# Patient Record
Sex: FEMALE | Race: WHITE | Employment: FULL TIME | URBAN - METROPOLITAN AREA
[De-identification: names, ages, dates, MRNs, and addresses within clinical notes are randomized per-mention and may not be internally consistent; named-entity substitution may affect disease eponyms.]

---

## 2018-12-19 ENCOUNTER — TELEPHONE (OUTPATIENT)
Dept: OTHER | Facility: CLINIC | Age: 43
End: 2018-12-19

## 2019-01-15 ENCOUNTER — OFFICE VISIT (OUTPATIENT)
Dept: FAMILY MEDICINE | Facility: CLINIC | Age: 44
End: 2019-01-15
Payer: COMMERCIAL

## 2019-01-15 VITALS
SYSTOLIC BLOOD PRESSURE: 130 MMHG | OXYGEN SATURATION: 98 % | WEIGHT: 149 LBS | DIASTOLIC BLOOD PRESSURE: 81 MMHG | RESPIRATION RATE: 18 BRPM | TEMPERATURE: 97.8 F | HEIGHT: 66 IN | HEART RATE: 80 BPM | BODY MASS INDEX: 23.95 KG/M2

## 2019-01-15 DIAGNOSIS — Z00.00 WELL ADULT EXAM: Primary | ICD-10-CM

## 2019-01-15 DIAGNOSIS — L98.9 SKIN LESION: ICD-10-CM

## 2019-01-15 DIAGNOSIS — N84.0 UTERINE POLYP: ICD-10-CM

## 2019-01-15 PROCEDURE — G0145 SCR C/V CYTO,THINLAYER,RESCR: HCPCS | Performed by: FAMILY MEDICINE

## 2019-01-15 PROCEDURE — 87624 HPV HI-RISK TYP POOLED RSLT: CPT | Performed by: FAMILY MEDICINE

## 2019-01-15 PROCEDURE — G0124 SCREEN C/V THIN LAYER BY MD: HCPCS | Performed by: FAMILY MEDICINE

## 2019-01-15 PROCEDURE — 99386 PREV VISIT NEW AGE 40-64: CPT | Performed by: FAMILY MEDICINE

## 2019-01-15 ASSESSMENT — ENCOUNTER SYMPTOMS
COUGH: 0
HEMATOCHEZIA: 0
PALPITATIONS: 0
ARTHRALGIAS: 0
CHILLS: 0
JOINT SWELLING: 0
PARESTHESIAS: 0
DIZZINESS: 0
HEADACHES: 0
FEVER: 0
FREQUENCY: 0
BREAST MASS: 0
DIARRHEA: 0
NERVOUS/ANXIOUS: 0
SHORTNESS OF BREATH: 0
CONSTIPATION: 0
SORE THROAT: 0
WEAKNESS: 0
EYE PAIN: 0
HEARTBURN: 0
ABDOMINAL PAIN: 0
MYALGIAS: 0
NAUSEA: 0
DYSURIA: 0
HEMATURIA: 0

## 2019-01-15 ASSESSMENT — MIFFLIN-ST. JEOR: SCORE: 1346.58

## 2019-01-15 NOTE — PROGRESS NOTES
SUBJECTIVE:   CC: Maxine Segundo is an 43 year old woman who presents for preventive health visit.     Physical   Annual:     Getting at least 3 servings of Calcium per day:  Yes    Bi-annual eye exam:  Yes    Dental care twice a year:  Yes    Sleep apnea or symptoms of sleep apnea:  None    Diet:  Vegetarian/vegan    Frequency of exercise:  4-5 days/week    Duration of exercise:  45-60 minutes    Taking medications regularly:  No    Barriers to taking medications:  None    Additional concerns today:  Yes (uterine polyps )    PHQ-2 Total Score: 0    CV: no early CAD in the family, she is active and has a healthy diet  Malignancy: last pap was 3 yrs ago, states she is due, denies h/o abnormal pap.  No breast or colon cancer in the family.  She has not had mammogram.  Bone health: no risk factors  Immunizations: tdap done 3 yrs ago  Sexual bill: she has three children who are grown and in college. She has a 3 yo daughter and would like to have another child after the uterine polyp has been addressed. Periods were always regular until about 4-5 months ago, when she started having some irregular bleeding between periods, which remain normal.  US found a uterine polyp.  She was scheduled to have this removed, but due to insurance change, she will need to establish with a new OB/GYN within our system.    Depression screen: negative.     April is a native of [place] . She, her  and 3 yo daughter moved here 7 months ago for her 's job ([employer]). She works for [employer] in [place].  Her three older children remain in [place] going to college.              Today's PHQ-2 Score:   PHQ-2 ( 1999 [employer]) 1/15/2019   Q1: Little interest or pleasure in doing things 0   Q2: Feeling down, depressed or hopeless 0   PHQ-2 Score 0   Q1: Little interest or pleasure in doing things Not at all   Q2: Feeling down, depressed or hopeless Not at all   PHQ-2 Score 0     Abuse: Current or Past(Physical, Sexual or  Emotional)- No  Do you feel safe in your environment? Yes    Social History     Tobacco Use     Smoking status: Never Smoker     Smokeless tobacco: Never Used   Substance Use Topics     Alcohol use: Yes     Alcohol Use 1/15/2019   If you drink alcohol do you typically have greater than 3 drinks per day OR greater than 7 drinks per week? No   No flowsheet data found.    Reviewed orders with patient.  Reviewed health maintenance and updated orders accordingly - Yes  There is no problem list on file for this patient.    History reviewed. No pertinent surgical history.    Social History     Tobacco Use     Smoking status: Never Smoker     Smokeless tobacco: Never Used   Substance Use Topics     Alcohol use: Yes     Family History   Problem Relation Age of Onset     Brain Cancer Mother          No current outpatient medications on file.     No Known Allergies    Mammogram Screening: Patient under age 50, mutual decision reflected in health maintenance.      Pertinent mammograms are reviewed under the imaging tab.  History of abnormal Pap smear: NO - age 30-65 PAP every 5 years with negative HPV co-testing recommended     Reviewed and updated as needed this visit by clinical staff  Tobacco  Allergies  Meds  Med Hx  Surg Hx  Fam Hx  Soc Hx        Reviewed and updated as needed this visit by Provider            Review of Systems   Constitutional: Negative for chills and fever.   HENT: Negative for congestion, ear pain, hearing loss and sore throat.    Eyes: Negative for pain and visual disturbance.   Respiratory: Negative for cough and shortness of breath.    Cardiovascular: Negative for chest pain, palpitations and peripheral edema.   Gastrointestinal: Negative for abdominal pain, constipation, diarrhea, heartburn, hematochezia and nausea.   Breasts:  Negative for tenderness, breast mass and discharge.   Genitourinary: Positive for vaginal bleeding. Negative for dysuria, frequency, genital sores, hematuria, pelvic  "pain, urgency and vaginal discharge.   Musculoskeletal: Negative for arthralgias, joint swelling and myalgias.   Skin: Negative for rash.   Neurological: Negative for dizziness, weakness, headaches and paresthesias.   Psychiatric/Behavioral: Negative for mood changes. The patient is not nervous/anxious.           OBJECTIVE:   /81 (BP Location: Right arm, Patient Position: Sitting, Cuff Size: Adult Regular)   Pulse 80   Temp 97.8  F (36.6  C) (Oral)   Resp 18   Ht 1.683 m (5' 6.25\")   Wt 67.6 kg (149 lb)   LMP 12/27/2018 (Exact Date)   SpO2 98%   BMI 23.87 kg/m    Physical Exam  GENERAL: healthy, alert and no distress  EYES: Eyes grossly normal to inspection, PERRL and conjunctivae and sclerae normal  HENT: ear canals and TM's normal, nose and mouth without ulcers or lesions  NECK: no adenopathy, no asymmetry, masses, or scars and thyroid normal to palpation  RESP: lungs clear to auscultation - no rales, rhonchi or wheezes  BREAST: normal without masses, tenderness or nipple discharge and no palpable axillary masses or adenopathy  CV: regular rate and rhythm, normal S1 S2, no S3 or S4, no murmur, click or rub, no peripheral edema and peripheral pulses strong  ABDOMEN: soft, nontender, no hepatosplenomegaly, no masses and bowel sounds normal   (female): normal female external genitalia, normal urethral meatus, vaginal mucosa pink, moist, well rugated, and normal cervix/adnexa/uterus without masses or discharge; cervix is friable  MS: no gross musculoskeletal defects noted, no edema  SKIN: a few scattered benign-appearing nevi to her back; there is a larger 4-5mm nevus with dark to medium brown pigment and irregular borders to her center chest.  Otherwise, no suspicious lesions or rashes  NEURO: Normal strength and tone, mentation intact and speech normal  PSYCH: mentation appears normal, affect normal/bright        ASSESSMENT/PLAN:   1. Well adult exam  CV: no risk factors; recommend fasting labs next " "year.  Malignancy: pap done; discussed current guidelines for mammography.  Colonoscopy at 50.  Bone health: no risk factors  Immunizations: up to date  Sexual health: referral is done to OB/GYN regarding the uterine poyp  - Pap imaged thin layer screen with HPV - recommended age 30 - 65 years (select HPV order below)  - HPV High Risk Types DNA Cervical    2. Uterine polyp    - OB/GYN REFERRAL    3. Skin lesion    - DERMATOLOGY REFERRAL    COUNSELING:  Reviewed preventive health counseling, as reflected in patient instructions    BP Readings from Last 1 Encounters:   01/15/19 130/81     Estimated body mass index is 23.87 kg/m  as calculated from the following:    Height as of this encounter: 1.683 m (5' 6.25\").    Weight as of this encounter: 67.6 kg (149 lb).           reports that  has never smoked. she has never used smokeless tobacco.      Counseling Resources:  ATP IV Guidelines  Pooled Cohorts Equation Calculator  Breast Cancer Risk Calculator  FRAX Risk Assessment  ICSI Preventive Guidelines  Dietary Guidelines for Americans, 2010  USDA's MyPlate  ASA Prophylaxis  Lung CA Screening    Selena Maciel MD  [facility]  "

## 2019-01-18 ENCOUNTER — RESULT FOLLOW UP (OUTPATIENT)
Dept: FAMILY MEDICINE | Facility: CLINIC | Age: 44
End: 2019-01-18

## 2019-01-18 DIAGNOSIS — R87.613 HSIL (HIGH GRADE SQUAMOUS INTRAEPITHELIAL LESION) ON PAP SMEAR OF CERVIX: ICD-10-CM

## 2019-01-18 LAB
COPATH REPORT: ABNORMAL
PAP: ABNORMAL

## 2019-01-18 NOTE — PROGRESS NOTES
1/15/19 HSIL,+HR HPV, not 16/18. Plan colp by 4/15/19.  01/22/19:Msg left to call back. (sk)  01/29/19:Msg left to call back. Pt was advised. (sk)  03/05/19: [place] Bx's HERVE 1-3. Plan LEEP with Hysteroscopy in the OR, due bef 06/05/19.(sk)  03/12/19: Pt was advised. (sk)  04/17/19: LEEP HERVE 3, margin's neg. Plan cotest in 1 year. (sk)  04/23/19:Msg left to call back. Pt was advised. (sk)  2/11/20 ASCUS pap with Neg HPV. Plan cotest in 1 year per Ob/Gyn provider. (ROSALINDA) Tc to mail saved result and recommendation letter. Pt isn't viewing [employer] messages. (sk)  02/18/20 Result letter sent at request of RN. (leticia)

## 2019-01-21 LAB
FINAL DIAGNOSIS: ABNORMAL
HPV HR 12 DNA CVX QL NAA+PROBE: POSITIVE
HPV16 DNA SPEC QL NAA+PROBE: NEGATIVE
HPV18 DNA SPEC QL NAA+PROBE: NEGATIVE
SPECIMEN DESCRIPTION: ABNORMAL
SPECIMEN SOURCE CVX/VAG CYTO: ABNORMAL

## 2019-03-05 ENCOUNTER — OFFICE VISIT (OUTPATIENT)
Dept: OBGYN | Facility: CLINIC | Age: 44
End: 2019-03-05
Payer: COMMERCIAL

## 2019-03-05 VITALS
DIASTOLIC BLOOD PRESSURE: 60 MMHG | WEIGHT: 147 LBS | HEART RATE: 72 BPM | SYSTOLIC BLOOD PRESSURE: 100 MMHG | BODY MASS INDEX: 23.55 KG/M2

## 2019-03-05 DIAGNOSIS — N84.0 ENDOMETRIAL POLYP: ICD-10-CM

## 2019-03-05 DIAGNOSIS — R87.613 HSIL (HIGH GRADE SQUAMOUS INTRAEPITHELIAL LESION) ON PAP SMEAR OF CERVIX: ICD-10-CM

## 2019-03-05 LAB — HCG UR QL: NEGATIVE

## 2019-03-05 PROCEDURE — 57454 BX/CURETT OF CERVIX W/SCOPE: CPT | Performed by: OBSTETRICS & GYNECOLOGY

## 2019-03-05 PROCEDURE — 81025 URINE PREGNANCY TEST: CPT | Performed by: OBSTETRICS & GYNECOLOGY

## 2019-03-05 PROCEDURE — 88305 TISSUE EXAM BY PATHOLOGIST: CPT | Performed by: OBSTETRICS & GYNECOLOGY

## 2019-03-05 NOTE — PROGRESS NOTES
Maxine Segundo is a 43 year old female  who presents for initial colposcopy, referred by Selena Maciel. Pap smear 2 months ago showed: HGSIL with other HR HPV. This was first abnormal pap smear and moved from [place] last July.  Prior Pap smear had been 3 years ago the birth of her daughter.    Had been receiving care with [facility] and then insurance changed at the first of the year.  Has known to small fibroids and a polyp that was seen inside the uterus on ultrasound.  Wants to discuss procedure to remove this today as well.  Ultrasound in care everywhere.  Myomata:  Location Longitudinal AP Transverse    Post Lt 2.4 2.1 2.5 cm subserosal   Post Mid 1.9 1.5 1.4 cm subserosal     Saline infusion sonohysterogram: yes.  Indication:   abnormal bleeding.   Following informed consent, the cervix was visualized and prepped with   betadine. An insemination catheter was placed into the endometrial cavity   and saline was infused while watching with the transvaginal probe.   Findings were:   echogenic mass protruding into endometrial cavity 0.9 x   0.6x 1.0 cm, location: Ant Lt, broad based  endometrium smooth and regular    Patient's last menstrual period was 12/27/2018 (exact date).  UPT today is negative  Patient does not smoke  Type of contraception: none  Age at first sexual intercourse:   Number of sexual partners (lifetime): *Past GYN history:  No STD history  Prior cervical/vaginal disease: none.  Prior cervical treatment: no treatment.      PROCEDURE:  Before the procedure, it was ensured that the patient was educated regarding the nature of her findings to date, the implications, and what was to be done. She has been made aware of the role of HPV, the natural history of infection, ways to minimize her future risk, the effect of HPV on the cervix, and treatment options available should they be indicated.  The details of the   colposcopic procedure were reviewed.  All questions were answered before  proceeding, and informed consent was therefore obtained.    Speculum placed in vagina and excellent visualization of cervix   acheived, cervix swabbed x 3 with acetic acid solution.    FINDINGS:  Cervix: acetowhite area from 9:00 to 12:00, mosaicism noted at 4-6:00 and nabothian cysts also noted  Please refer to images section for details.    Pap repeated?:  No  SCJ seen?:  no    ECC done?:  Yes   Lugol's solution used?:  Yes   Satisfactory examination?:  no    Xylocaine jelly was applied and cervical biopsy done at 6:00 and ECC sent in separate specimen containers to pathology.  Patient tolerated well.  Patient had brisk bleeding from the 6 o'clock position and lots of Monsel's and pressure was used to obtain hemostasis EBL 30 cc    ASSESSMENT: HERVE 2 and HERVE 3.    PLAN: specimens labelled and sent to Pathology, will base further treatment on Pathology findings, treatment options discussed with patient and call to discuss Pathology results.  Discussed with her Pap history and amount of bleeding she had with the biopsy, suspect she will need a LEEP procedure.  She was given the ACOG brochure on LEEP and the procedure was reviewed.    Hysteroscopy was also discussed with the patient for polypectomy and this could be a combined procedure.  Reviewed was done under IV sedation with paracervical block and outpatient.  We will find out biopsy results from coloposcopy first and then to figure out plan for outpatient surgery.    Janet Garay MD

## 2019-03-05 NOTE — NURSING NOTE
"Chief Complaint   Patient presents with     Colposcopy       Initial /60 (BP Location: Right arm, Patient Position: Sitting, Cuff Size: Adult Regular)   Pulse 72   Wt 66.7 kg (147 lb)   LMP 2018 (Exact Date)   BMI 23.55 kg/m   Estimated body mass index is 23.55 kg/m  as calculated from the following:    Height as of 1/15/19: 1.683 m (5' 6.25\").    Weight as of this encounter: 66.7 kg (147 lb).  BP completed using cuff size: regular    Questioned patient about current smoking habits.  Pt. has never smoked.          The following HM Due: NONE      The following patient reported/Care Every where data was sent to:  P ABSTRACT QUALITY INITIATIVES [[id number]]  KIKI Hendrickson           "

## 2019-03-05 NOTE — PATIENT INSTRUCTIONS
GYN Ultrasound    Exam performed on:  11/20/2018  Referring provider: Marta Garay  Referring clinic: [facility]  Clinical indications: abnormal bleeding  LMP:  11/13/18  Sonographer(s) initials: DP    The pelvic organs are imaged using: both transvaginal and transabdominal   transducers to better visualize the pelvic anatomy.  Today's ultrasound was compared to previous report from: none    Measurements and comments  Uterus:  Longitudinal AP Transverse   10.3 4.6 7.3 cm   Position:  anteverted  Comments:  symmetrical    Cervix and lower uterine segment are: Normal  Myometrium: heterogeneous    Myomata:  Location Longitudinal AP Transverse    Post Lt 2.4 2.1 2.5 cm subserosal   Post Mid 1.9 1.5 1.4 cm subserosal     Saline infusion sonohysterogram: yes.  Indication:   abnormal bleeding.   Following informed consent, the cervix was visualized and prepped with   betadine. An insemination catheter was placed into the endometrial cavity   and saline was infused while watching with the transvaginal probe.   Findings were:   echogenic mass protruding into endometrial cavity 0.9 x   0.6x 1.0 cm, location: Ant Lt, broad based  endometrium smooth and regular    anterior endometrium 3.5 mm  posterior endometrium 2.8 mm    Endometrium: 6.3 mm, post SIS  Endometrium: 9.9 mm, pre SIS    Right ovary:   Longitudinal AP Transverse   2.9 2.2 2.3 cm   Comments: appears normal    Left ovary:  Longitudinal AP Transverse   2.0 1.2 1.6 cm   Comments: appears normal    Adnexa:  no masses seen    Peritoneal fluid: absent    Impression: Sessile endometrial polyp arising from the anterior left   endometrium.   Uterus with multiple myomas, none encroaching on the endometrial cavity.    Plan: These findings were reviewed with the patient. She will follow up   with her referring provider.     Nemo Castillo MD      Call [phone] if bleeding or questions about the cervix

## 2019-03-08 LAB — COPATH REPORT: NORMAL

## 2019-03-11 DIAGNOSIS — N84.0 ENDOMETRIAL POLYP: ICD-10-CM

## 2019-03-11 DIAGNOSIS — R87.613 HSIL (HIGH GRADE SQUAMOUS INTRAEPITHELIAL LESION) ON PAP SMEAR OF CERVIX: Primary | ICD-10-CM

## 2019-03-11 NOTE — PROGRESS NOTES
Called and spoke with patient about her cervical biopsy results and need for LEEP procedure.  She will schedule this with hysteroscopy and polypectomy as we had discussed at her colposcopy appointment.    Janet Garay MD

## 2019-03-12 ENCOUNTER — TELEPHONE (OUTPATIENT)
Dept: OBGYN | Facility: CLINIC | Age: 44
End: 2019-03-12

## 2019-03-12 NOTE — TELEPHONE ENCOUNTER
Type of surgery: gyn  Location of surgery: [facility]  Date and time of surgery: 4/17/19 1140a  Surgeon: Jarrod  Pre-Op Appt Date: tbd  Post-Op Appt Date: tbd   Packet sent out: Yes  Pre-cert/Authorization completed:  No  Date: 3/12/2019    Fatuma BREWER, Surgery Coordinator

## 2019-03-21 ENCOUNTER — OFFICE VISIT (OUTPATIENT)
Dept: FAMILY MEDICINE | Facility: CLINIC | Age: 44
End: 2019-03-21
Payer: COMMERCIAL

## 2019-03-21 VITALS
TEMPERATURE: 98.3 F | WEIGHT: 146.8 LBS | SYSTOLIC BLOOD PRESSURE: 107 MMHG | RESPIRATION RATE: 16 BRPM | HEART RATE: 75 BPM | OXYGEN SATURATION: 98 % | HEIGHT: 67 IN | DIASTOLIC BLOOD PRESSURE: 67 MMHG | BODY MASS INDEX: 23.04 KG/M2

## 2019-03-21 DIAGNOSIS — Z01.818 PREOP GENERAL PHYSICAL EXAM: Primary | ICD-10-CM

## 2019-03-21 DIAGNOSIS — D06.9 CARCINOMA IN SITU OF CERVIX, UNSPECIFIED LOCATION: ICD-10-CM

## 2019-03-21 DIAGNOSIS — N84.0 ENDOMETRIAL POLYP: ICD-10-CM

## 2019-03-21 PROCEDURE — 99213 OFFICE O/P EST LOW 20 MIN: CPT | Performed by: FAMILY MEDICINE

## 2019-03-21 RX ORDER — FERROUS SULFATE 325(65) MG
325 TABLET ORAL
COMMUNITY

## 2019-03-21 RX ORDER — MAGNESIUM 200 MG
TABLET ORAL
COMMUNITY

## 2019-03-21 ASSESSMENT — MIFFLIN-ST. JEOR: SCORE: 1340.57

## 2019-03-21 NOTE — PROGRESS NOTES
[address] [address]  [phone]  Dept: [phone]    PRE-OP EVALUATION:  Today's date: 3/21/2019    Maxine Segundo (: 1975) presents for pre-operative evaluation assessment as requested by Dr. Shah.  She requires evaluation and anesthesia risk assessment prior to LEEP for abnormal colposcopy showing HERVE II and III and hysteroscopy and polypectomy for removal of uterine polyp.    Fax number for surgical facility: [facility]  Primary Physician: Selena Maciel  Type of Anesthesia Anticipated: to be determined    Patient has a Health Care Directive or Living Will:  NO    Preop Questions 3/21/2019   Who is doing your surgery? Dr Shah   What are you having done? LEEP and removing uterine polyp   Date of Surgery/Procedure: 19   Facility or Hospital where procedure/surgery will be performed: [facility]   1.  Do you have a history of Heart attack, stroke, stent, coronary bypass surgery, or other heart surgery? No   2.  Do you ever have any pain or discomfort in your chest? No   3.  Do you have a history of  Heart Failure? No   4.   Are you troubled by shortness of breath when:  walking on a level surface, or up a slight hill, or at night? No   5.  Do you currently have a cold, bronchitis or other respiratory infection? No   6.  Do you have a cough, shortness of breath, or wheezing? No   7.  Do you sometimes get pains in the calves of your legs when you walk? No   8. Do you or anyone in your family have previous history of blood clots? No   9.  Do you or does anyone in your family have a serious bleeding problem such as prolonged bleeding following surgeries or cuts? No   10. Have you ever had problems with anemia or been told to take iron pills? YES - in the past and currently takes iron supplement   11. Have you had any abnormal blood loss such as black, tarry or bloody stools, or abnormal vaginal bleeding? YES - irregular  periods   12. Have you ever had a blood transfusion? No   13. Have you or any of your relatives ever had problems with anesthesia? No   14. Do you have sleep apnea, excessive snoring or daytime drowsiness? No   15. Do you have any prosthetic heart valves? No   16. Do you have prosthetic joints? No   17. Is there any chance that you may be pregnant? No         HPI:     HPI related to upcoming procedure: Recent irregular bleeding found uterine polyp on U/S as cause.  Follow-up PAP was abnormal HSIL with colposcopy confirming HERVE II and III necessitating LEEP.      See problem list for active medical problems.  Problems all longstanding and stable, except as noted/documented.  See ROS for pertinent symptoms related to these conditions.                                                                                                                                                          .    MEDICAL HISTORY:     Patient Active Problem List    Diagnosis Date Noted     Endometrial polyp 03/05/2019     Priority: Medium     HERVE III (cervical intraepithelial neoplasia III) 01/15/2019     Priority: Medium     1/15/19 HSIL, +HR HPV, not 16/18. Plan colp.  03/05/19: [place] Bx's HERVE 1-3. Plan LEEP with Hysteroscopy in the OR.        Past Medical History:   Diagnosis Date     Abnormal Pap smear of cervix 01/15/2019    See problem list     Cervical high risk HPV (human papillomavirus) test positive 01/15/2019    See problem list     History reviewed. No pertinent surgical history.  Current Outpatient Medications   Medication Sig Dispense Refill     Calcium-Magnesium-Zinc 333-133-5 MG TABS per tablet        cholecalciferol (VITAMIN D3) 5000 units TABS tablet        cyanocobalamin (VITAMIN B-12) 1000 MCG SUBL sublingual tablet        ferrous sulfate (FEROSUL) 325 (65 Fe) MG tablet Take 325 mg by mouth       MULTIPLE VITAMINS-MINERALS ER PO Womens ALIVE       OTC products: None, except as noted above    Allergies   Allergen Reactions  "    Beef-Derived Products      Food      Milk Protein Extract       Latex Allergy: NO    Social History     Tobacco Use     Smoking status: Former Smoker     Types: Cigarettes     Smokeless tobacco: Never Used   Substance Use Topics     Alcohol use: Yes     Comment: wine on weekends     History   Drug Use No       REVIEW OF SYSTEMS:   CONSTITUTIONAL: NEGATIVE for fever, chills, change in weight  INTEGUMENTARY/SKIN: NEGATIVE for worrisome rashes, moles or lesions  EYES: NEGATIVE for vision changes or irritation  ENT/MOUTH: NEGATIVE for ear, mouth and throat problems  RESP: NEGATIVE for significant cough or SOB  BREAST: NEGATIVE for masses, tenderness or discharge  CV: NEGATIVE for chest pain, palpitations or peripheral edema  GI: NEGATIVE for nausea, abdominal pain, heartburn, or change in bowel habits  : NEGATIVE for frequency, dysuria, or hematuria  MUSCULOSKELETAL: NEGATIVE for significant arthralgias or myalgia  NEURO: NEGATIVE for weakness, dizziness or paresthesias  ENDOCRINE: NEGATIVE for temperature intolerance, skin/hair changes  HEME: NEGATIVE for bleeding problems  PSYCHIATRIC: NEGATIVE for changes in mood or affect    EXAM:   /67   Pulse 75   Temp 98.3  F (36.8  C) (Oral)   Resp 16   Ht 1.689 m (5' 6.5\")   Wt 66.6 kg (146 lb 12.8 oz)   LMP 02/12/2019 (Exact Date)   SpO2 98%   Breastfeeding? No   BMI 23.34 kg/m      GENERAL APPEARANCE: healthy, alert and no distress     EYES: EOMI, PERRL     HENT: ear canals and TM's normal and nose and mouth without ulcers or lesions     NECK: no adenopathy, no asymmetry, masses, or scars and thyroid normal to palpation     RESP: lungs clear to auscultation - no rales, rhonchi or wheezes     CV: regular rates and rhythm, normal S1 S2, no S3 or S4 and no murmur, click or rub     ABDOMEN:  soft, nontender, no HSM or masses and bowel sounds normal     MS: extremities normal- no gross deformities noted, no evidence of inflammation in joints, FROM in all " extremities.     SKIN: no suspicious lesions or rashes     NEURO: Normal strength and tone, sensory exam grossly normal, mentation intact and speech normal     PSYCH: mentation appears normal. and affect normal/bright     LYMPHATICS: No cervical adenopathy    DIAGNOSTICS:   No labs or EKG required for low risk surgery (cataract, skin procedure, breast biopsy, etc)    No results for input(s): HGB, PLT, INR, NA, POTASSIUM, CR, A1C in the last 64748 hours.     IMPRESSION:   Reason for surgery/procedure: as above    The proposed surgical procedure is considered LOW risk.    REVISED CARDIAC RISK INDEX  The patient has the following serious cardiovascular risks for perioperative complications such as (MI, PE, VFib and 3  AV Block):  No serious cardiac risks  INTERPRETATION: 0 risks: Class I (very low risk - 0.4% complication rate)    The patient has the following additional risks for perioperative complications:  No identified additional risks      ICD-10-CM    1. Preop general physical exam Z01.818    2. Endometrial polyp N84.0    3. Carcinoma in situ of cervix, unspecified location D06.9        RECOMMENDATIONS:     --Consult hospital rounder / IM to assist post-op medical management    --Patient is to take all scheduled medications on the day of surgery EXCEPT for modifications listed below.    APPROVAL GIVEN to proceed with proposed procedure, without further diagnostic evaluation       Signed Electronically by: Fanny Cano MD    Copy of this evaluation report is provided to requesting physician.    [facility] Preop Guidelines    Revised Cardiac Risk Index

## 2019-04-10 DIAGNOSIS — Z01.818 PRE-OP EXAM: Primary | ICD-10-CM

## 2019-04-10 DIAGNOSIS — D06.9 CIN III (CERVICAL INTRAEPITHELIAL NEOPLASIA III): ICD-10-CM

## 2019-04-15 DIAGNOSIS — D06.9 CIN III (CERVICAL INTRAEPITHELIAL NEOPLASIA III): ICD-10-CM

## 2019-04-15 DIAGNOSIS — Z01.818 PRE-OP EXAM: ICD-10-CM

## 2019-04-15 LAB
ABO + RH BLD: NORMAL
ABO + RH BLD: NORMAL
BLD GP AB SCN SERPL QL: NORMAL
BLOOD BANK CMNT PATIENT-IMP: NORMAL
ERYTHROCYTE [DISTWIDTH] IN BLOOD BY AUTOMATED COUNT: 12.6 % (ref 10–15)
HCT VFR BLD AUTO: 38.5 % (ref 35–47)
HGB BLD-MCNC: 12.3 G/DL (ref 11.7–15.7)
MCH RBC QN AUTO: 30.2 PG (ref 26.5–33)
MCHC RBC AUTO-ENTMCNC: 31.9 G/DL (ref 31.5–36.5)
MCV RBC AUTO: 95 FL (ref 78–100)
PLATELET # BLD AUTO: 192 10E9/L (ref 150–450)
RBC # BLD AUTO: 4.07 10E12/L (ref 3.8–5.2)
SPECIMEN EXP DATE BLD: NORMAL
WBC # BLD AUTO: 3.2 10E9/L (ref 4–11)

## 2019-04-15 PROCEDURE — 36415 COLL VENOUS BLD VENIPUNCTURE: CPT | Performed by: OBSTETRICS & GYNECOLOGY

## 2019-04-15 PROCEDURE — 86900 BLOOD TYPING SEROLOGIC ABO: CPT | Performed by: OBSTETRICS & GYNECOLOGY

## 2019-04-15 PROCEDURE — 86850 RBC ANTIBODY SCREEN: CPT | Performed by: OBSTETRICS & GYNECOLOGY

## 2019-04-15 PROCEDURE — 86901 BLOOD TYPING SEROLOGIC RH(D): CPT | Performed by: OBSTETRICS & GYNECOLOGY

## 2019-04-15 PROCEDURE — 85027 COMPLETE CBC AUTOMATED: CPT | Performed by: OBSTETRICS & GYNECOLOGY

## 2019-04-16 ENCOUNTER — ANESTHESIA EVENT (OUTPATIENT)
Dept: SURGERY | Facility: CLINIC | Age: 44
End: 2019-04-16
Payer: COMMERCIAL

## 2019-04-17 ENCOUNTER — ANESTHESIA (OUTPATIENT)
Dept: SURGERY | Facility: CLINIC | Age: 44
End: 2019-04-17
Payer: COMMERCIAL

## 2019-04-17 ENCOUNTER — HOSPITAL ENCOUNTER (OUTPATIENT)
Facility: CLINIC | Age: 44
Discharge: HOME | End: 2019-04-17
Attending: OBSTETRICS & GYNECOLOGY | Admitting: OBSTETRICS & GYNECOLOGY
Payer: COMMERCIAL

## 2019-04-17 VITALS
DIASTOLIC BLOOD PRESSURE: 71 MMHG | WEIGHT: 150.13 LBS | RESPIRATION RATE: 12 BRPM | SYSTOLIC BLOOD PRESSURE: 109 MMHG | BODY MASS INDEX: 23.56 KG/M2 | HEIGHT: 67 IN | OXYGEN SATURATION: 100 % | TEMPERATURE: 97.9 F | HEART RATE: 66 BPM

## 2019-04-17 LAB
GLUCOSE BLDC GLUCOMTR-MCNC: 98 MG/DL (ref 70–99)
HCG UR QL: NEGATIVE

## 2019-04-17 PROCEDURE — 36000057 ZZH SURGERY LEVEL 3 1ST 30 MIN - UMMC: Performed by: OBSTETRICS & GYNECOLOGY

## 2019-04-17 PROCEDURE — 40000170 ZZH STATISTIC PRE-PROCEDURE ASSESSMENT II: Performed by: OBSTETRICS & GYNECOLOGY

## 2019-04-17 PROCEDURE — 58558 HYSTEROSCOPY BIOPSY: CPT | Mod: GC | Performed by: OBSTETRICS & GYNECOLOGY

## 2019-04-17 PROCEDURE — 37000009 ZZH ANESTHESIA TECHNICAL FEE, EACH ADDTL 15 MIN: Performed by: OBSTETRICS & GYNECOLOGY

## 2019-04-17 PROCEDURE — 88305 TISSUE EXAM BY PATHOLOGIST: CPT | Performed by: OBSTETRICS & GYNECOLOGY

## 2019-04-17 PROCEDURE — 36000059 ZZH SURGERY LEVEL 3 EA 15 ADDTL MIN UMMC: Performed by: OBSTETRICS & GYNECOLOGY

## 2019-04-17 PROCEDURE — 57522 CONIZATION OF CERVIX: CPT | Mod: GC | Performed by: OBSTETRICS & GYNECOLOGY

## 2019-04-17 PROCEDURE — 71000027 ZZH RECOVERY PHASE 2 EACH 15 MINS: Performed by: OBSTETRICS & GYNECOLOGY

## 2019-04-17 PROCEDURE — 25000128 H RX IP 250 OP 636: Performed by: NURSE ANESTHETIST, CERTIFIED REGISTERED

## 2019-04-17 PROCEDURE — 25000125 ZZHC RX 250: Performed by: NURSE ANESTHETIST, CERTIFIED REGISTERED

## 2019-04-17 PROCEDURE — 37000008 ZZH ANESTHESIA TECHNICAL FEE, 1ST 30 MIN: Performed by: OBSTETRICS & GYNECOLOGY

## 2019-04-17 PROCEDURE — 25800030 ZZH RX IP 258 OP 636: Performed by: ANESTHESIOLOGY

## 2019-04-17 PROCEDURE — 25000125 ZZHC RX 250: Performed by: OBSTETRICS & GYNECOLOGY

## 2019-04-17 PROCEDURE — 81025 URINE PREGNANCY TEST: CPT | Performed by: OBSTETRICS & GYNECOLOGY

## 2019-04-17 PROCEDURE — 82962 GLUCOSE BLOOD TEST: CPT

## 2019-04-17 PROCEDURE — 88305 TISSUE EXAM BY PATHOLOGIST: CPT | Mod: 26 | Performed by: OBSTETRICS & GYNECOLOGY

## 2019-04-17 PROCEDURE — 27210794 ZZH OR GENERAL SUPPLY STERILE: Performed by: OBSTETRICS & GYNECOLOGY

## 2019-04-17 RX ORDER — LIDOCAINE HYDROCHLORIDE AND EPINEPHRINE 10; 10 MG/ML; UG/ML
INJECTION, SOLUTION INFILTRATION; PERINEURAL PRN
Status: DISCONTINUED | OUTPATIENT
Start: 2019-04-17 | End: 2019-04-17 | Stop reason: HOSPADM

## 2019-04-17 RX ORDER — NALOXONE HYDROCHLORIDE 0.4 MG/ML
.1-.4 INJECTION, SOLUTION INTRAMUSCULAR; INTRAVENOUS; SUBCUTANEOUS
Status: DISCONTINUED | OUTPATIENT
Start: 2019-04-17 | End: 2019-04-17 | Stop reason: HOSPADM

## 2019-04-17 RX ORDER — ONDANSETRON 2 MG/ML
INJECTION INTRAMUSCULAR; INTRAVENOUS PRN
Status: DISCONTINUED | OUTPATIENT
Start: 2019-04-17 | End: 2019-04-17

## 2019-04-17 RX ORDER — GABAPENTIN 100 MG/1
300 CAPSULE ORAL ONCE
Status: DISCONTINUED | OUTPATIENT
Start: 2019-04-17 | End: 2019-04-17 | Stop reason: HOSPADM

## 2019-04-17 RX ORDER — PROPOFOL 10 MG/ML
INJECTION, EMULSION INTRAVENOUS CONTINUOUS PRN
Status: DISCONTINUED | OUTPATIENT
Start: 2019-04-17 | End: 2019-04-17

## 2019-04-17 RX ORDER — HYDRALAZINE HYDROCHLORIDE 20 MG/ML
2.5-5 INJECTION INTRAMUSCULAR; INTRAVENOUS EVERY 10 MIN PRN
Status: DISCONTINUED | OUTPATIENT
Start: 2019-04-17 | End: 2019-04-17 | Stop reason: HOSPADM

## 2019-04-17 RX ORDER — FENTANYL CITRATE 50 UG/ML
25-50 INJECTION, SOLUTION INTRAMUSCULAR; INTRAVENOUS
Status: DISCONTINUED | OUTPATIENT
Start: 2019-04-17 | End: 2019-04-17 | Stop reason: HOSPADM

## 2019-04-17 RX ORDER — ACETAMINOPHEN 500 MG
1000 TABLET ORAL ONCE
Status: DISCONTINUED | OUTPATIENT
Start: 2019-04-17 | End: 2019-04-17 | Stop reason: HOSPADM

## 2019-04-17 RX ORDER — ONDANSETRON 4 MG/1
4 TABLET, ORALLY DISINTEGRATING ORAL EVERY 30 MIN PRN
Status: DISCONTINUED | OUTPATIENT
Start: 2019-04-17 | End: 2019-04-17 | Stop reason: HOSPADM

## 2019-04-17 RX ORDER — SODIUM CHLORIDE, SODIUM LACTATE, POTASSIUM CHLORIDE, CALCIUM CHLORIDE 600; 310; 30; 20 MG/100ML; MG/100ML; MG/100ML; MG/100ML
INJECTION, SOLUTION INTRAVENOUS CONTINUOUS
Status: DISCONTINUED | OUTPATIENT
Start: 2019-04-17 | End: 2019-04-17 | Stop reason: HOSPADM

## 2019-04-17 RX ORDER — KETOROLAC TROMETHAMINE 30 MG/ML
INJECTION, SOLUTION INTRAMUSCULAR; INTRAVENOUS PRN
Status: DISCONTINUED | OUTPATIENT
Start: 2019-04-17 | End: 2019-04-17

## 2019-04-17 RX ORDER — PROPOFOL 10 MG/ML
INJECTION, EMULSION INTRAVENOUS PRN
Status: DISCONTINUED | OUTPATIENT
Start: 2019-04-17 | End: 2019-04-17

## 2019-04-17 RX ORDER — FENTANYL CITRATE 50 UG/ML
25-50 INJECTION, SOLUTION INTRAMUSCULAR; INTRAVENOUS EVERY 5 MIN PRN
Status: DISCONTINUED | OUTPATIENT
Start: 2019-04-17 | End: 2019-04-17 | Stop reason: HOSPADM

## 2019-04-17 RX ORDER — EPHEDRINE SULFATE 50 MG/ML
INJECTION, SOLUTION INTRAVENOUS PRN
Status: DISCONTINUED | OUTPATIENT
Start: 2019-04-17 | End: 2019-04-17

## 2019-04-17 RX ORDER — ONDANSETRON 2 MG/ML
4 INJECTION INTRAMUSCULAR; INTRAVENOUS EVERY 30 MIN PRN
Status: DISCONTINUED | OUTPATIENT
Start: 2019-04-17 | End: 2019-04-17 | Stop reason: HOSPADM

## 2019-04-17 RX ORDER — LIDOCAINE HYDROCHLORIDE 20 MG/ML
INJECTION, SOLUTION INFILTRATION; PERINEURAL PRN
Status: DISCONTINUED | OUTPATIENT
Start: 2019-04-17 | End: 2019-04-17

## 2019-04-17 RX ORDER — FENTANYL CITRATE 50 UG/ML
INJECTION, SOLUTION INTRAMUSCULAR; INTRAVENOUS PRN
Status: DISCONTINUED | OUTPATIENT
Start: 2019-04-17 | End: 2019-04-17

## 2019-04-17 RX ORDER — HYDROMORPHONE HYDROCHLORIDE 1 MG/ML
.3-.5 INJECTION, SOLUTION INTRAMUSCULAR; INTRAVENOUS; SUBCUTANEOUS EVERY 10 MIN PRN
Status: DISCONTINUED | OUTPATIENT
Start: 2019-04-17 | End: 2019-04-17 | Stop reason: HOSPADM

## 2019-04-17 RX ADMIN — PROPOFOL 150 MCG/KG/MIN: 10 INJECTION, EMULSION INTRAVENOUS at 11:32

## 2019-04-17 RX ADMIN — PROPOFOL 60 MG: 10 INJECTION, EMULSION INTRAVENOUS at 11:32

## 2019-04-17 RX ADMIN — EPHEDRINE SULFATE 10 MG: 50 INJECTION, SOLUTION INTRAVENOUS at 11:56

## 2019-04-17 RX ADMIN — FENTANYL CITRATE 100 MCG: 50 INJECTION, SOLUTION INTRAMUSCULAR; INTRAVENOUS at 11:32

## 2019-04-17 RX ADMIN — KETOROLAC TROMETHAMINE 30 MG: 30 INJECTION, SOLUTION INTRAMUSCULAR at 11:45

## 2019-04-17 RX ADMIN — ONDANSETRON 4 MG: 2 INJECTION INTRAMUSCULAR; INTRAVENOUS at 11:39

## 2019-04-17 RX ADMIN — MIDAZOLAM 2 MG: 1 INJECTION INTRAMUSCULAR; INTRAVENOUS at 11:28

## 2019-04-17 RX ADMIN — LIDOCAINE HYDROCHLORIDE 60 MG: 20 INJECTION, SOLUTION INFILTRATION; PERINEURAL at 11:32

## 2019-04-17 RX ADMIN — SODIUM CHLORIDE, POTASSIUM CHLORIDE, SODIUM LACTATE AND CALCIUM CHLORIDE: 600; 310; 30; 20 INJECTION, SOLUTION INTRAVENOUS at 11:08

## 2019-04-17 ASSESSMENT — MIFFLIN-ST. JEOR: SCORE: 1358.63

## 2019-04-17 NOTE — ANESTHESIA PREPROCEDURE EVALUATION
Anesthesia Pre-Procedure Evaluation    Patient: Maxine Segundo   MRN:     [id number] Gender:   female   Age:    44 year old :      1975        Preoperative Diagnosis: HERVE Grade 2-3, Endometrial Polyp   Procedure(s):  Leep  Hysteroscopy With Polypectomy     Past Medical History:   Diagnosis Date     Abnormal Pap smear of cervix 01/15/2019    See problem list     Cervical high risk HPV (human papillomavirus) test positive 01/15/2019    See problem list      History reviewed. No pertinent surgical history.       Anesthesia Evaluation     .             ROS/MED HX    ENT/Pulmonary:  - neg pulmonary ROS     Neurologic:  - neg neurologic ROS     Cardiovascular:  - neg cardiovascular ROS       METS/Exercise Tolerance:  >4 METS   Hematologic:  - neg hematologic  ROS       Musculoskeletal:  - neg musculoskeletal ROS       GI/Hepatic:  - neg GI/hepatic ROS       Renal/Genitourinary:     (+) Other Renal/ Genitourinary, endometrial polyp HERVE      Endo:  - neg endo ROS       Psychiatric:  - neg psychiatric ROS       Infectious Disease:  - neg infectious disease ROS       Malignancy:         Other:                         PHYSICAL EXAM:   Mental Status/Neuro: A/A/O   Airway: Facies: Feasible   Respiratory:   Resp. Effort: Normal      CV:   Rate: Age appropriate   Comments:                    Lab Results   Component Value Date    WBC 3.2 (L) 04/15/2019    HGB 12.3 04/15/2019    HCT 38.5 04/15/2019     04/15/2019    HCG Negative 2019       Preop Vitals  BP Readings from Last 3 Encounters:   19 123/84   19 107/67   19 100/60    Pulse Readings from Last 3 Encounters:   19 63   19 75   19 72      Resp Readings from Last 3 Encounters:   19 16   19 16   01/15/19 18    SpO2 Readings from Last 3 Encounters:   19 100%   19 98%   01/15/19 98%      Temp Readings from Last 1 Encounters:   19 36.8  C (98.2  F) (Axillary)    Ht Readings from Last 1 Encounters:  "  04/17/19 1.702 m (5' 7\")      Wt Readings from Last 1 Encounters:   04/17/19 68.1 kg (150 lb 2.1 oz)    Estimated body mass index is 23.51 kg/m  as calculated from the following:    Height as of this encounter: 1.702 m (5' 7\").    Weight as of this encounter: 68.1 kg (150 lb 2.1 oz).     LDA:  Peripheral IV 04/17/19 Left Hand (Active)   Site Assessment WDL 4/17/2019 10:35 AM   Line Status Infusing 4/17/2019 10:35 AM   Phlebitis Scale 0-->no symptoms 4/17/2019 10:35 AM   Infiltration Scale 0 4/17/2019 10:35 AM   Number of days: 0            Assessment:   ASA SCORE: 1    NPO Status: > 6 hours since completed Solid [employer]   Documentation: H&P complete; Preop Testing complete; Consents complete   Proceeding: Proceed without further delay  Tobacco Use:  NO Active use of Tobacco/UNKNOWN Tobacco use status     Plan:   Anes. Type:  MAC   Pre-Induction: Midazolam IV   Induction:  IV (Standard)   Airway: Native Airway   Access/Monitoring: PIV   Maintenance: Propofol; IV   Emergence: Procedure Site   Logistics: Same Day Surgery     Postop Pain/Sedation Strategy:  Standard-Options: Opioids PRN     PONV Management:  Adult Risk Factors: Female, Non-Smoker, Postop Opioids  Prevention: Ondansetron; Dexamethasone; NO Volatile; Propofol Infusion     CONSENT: Direct conversation   Plan and risks discussed with: Patient                            Servando Stock MD  "

## 2019-04-17 NOTE — BRIEF OP NOTE
[facility]  Brief Operative Note    Date of Surgery: 4/17/2019    Preop Dx:  HERVE II/III, endometrial polyp     Postop Dx:   Same     Procedure:   LEEP, hysteroscopy, polypectomy    Surgeon:   Janet Garay MD    Assistants:   Janet Allan MD PGY-1     Ben Severseike, MS3     Anesthesia:  MAC     EBL:  10 cc    IVF:  1000 cc    UOP:  Unmeasured    Drains: None      Specimen:        ID Type Source Tests Collected by Time Destination   A : Endometrial polyp Tissue Uterus SURGICAL PATHOLOGY EXAM Janet Garay MD 4/17/2019 11:55 AM    B : LEEP patient right Tissue Cervix SURGICAL PATHOLOGY EXAM Janet Garay MD 4/17/2019 12:04 PM    C : LEEP patient left posterior w/ stitch Tissue Cervix SURGICAL PATHOLOGY EXAM Janet Garay MD 4/17/2019 12:13 PM    D : LEEP endocervical Tissue Cervix SURGICAL PATHOLOGY EXAM Janet Garay MD 4/17/2019 12:14 PM          Complications: None apparent     Findings: Normal external genitalia. Cervix appears large, multiparous. Cervical os is 1.5cm in length. No visible lesions. Uterine cavity normal appearing with polyp on anterior fundus.     Disposition:  Transferred in stable condition to the PACU

## 2019-04-17 NOTE — OP NOTE
[facility]   Operative Note    Date of service: 2019    Preoperative diagnosis:  HERVE II/III, endometrial polyp  Postoperative diagnosis:  Same    Procedure:  EUA, hysteroscopy, polypectomy, LEEP    Surgeon:  Janet Garay MD    Assistant:   1. Janet Allan MD PGY1  2. Ben Severerseike, MS3    Anesthesia:  MAC and 1% lidocaine with epi local  EBL:  10mL  IVF:  1000mL  UOP:  none  Fluid deficit:  300mL    Specimens: Endometrial polyp, LEEP- patient right, LEEP- patient left and posterior w/ stitch, LEEP- endocervical    Complications: None    Findings:    1. Exam under anesthesia revealed large multiparous cervix, anteverted uterus, no adnexal masses.   2. Speculum exam revealed large multiparous cervix with no lesions.   3. Hysteroscopy revealed single polyp on anterior wall of fundus, otherwise normal uterine cavity, and normal tubal ostia visualized bilaterally.       Indications:  Maxine Segundo is a 44 year old  who underwent colposcopy for HGSIL, positive for other high risk HPV.  Colposcopy pathology was significant for HGSIL (HERVE II/III) in endocervical curettings and LGSIL at 6 o'clock biopsy site. Given her findings, LEEP was recommended. Patient has also had known endometrial polyp that was visualized on saline infusion sonohysterogram and desired removal. Risks, benefits, and alternatives to the procedure were discussed with the patient who elected to proceed.  All questions were answered and an informed consent was obtained.    Procedure:  The patient was taken to the operating room where she underwent MAC anesthesia without difficulty.  She was placed in a dorsal lithotomy position using Luis F stirrups.  The patient was examined for the above noted findings and then prepped and draped in the usual sterile fashion.   A medium graves speculum was inserted into the vagina. The endocervical canal was already dilated 6 mm, confirmed using Hegar dilators.  The hysteroscope was inserted without  difficulty with the above findings noted.  The myosure was used to remove endometrial polyp. The hysteroscope was removed.  All instruments were then removed.  Rubber coated speculum was placed for the LEEP procedure. Tenaculum was placed on the anterior lip.  20cc 1% lidocaine with epinephrine was injected as an intracervical block in a clockwise fashion. A 20mm LOOP device was used to collect the above cervical specimen.  Multiple passes through the left half of the cervix were made to ensure adequate collection from the transformation zone. A 10mm LOOP device was then used to collect specimen from the endocervix. A roller ball was used to cauterize the cervical bed. The tenaculum was removed from the cervix and the puncture sites were made hemostatic roller ball.  The patient was repositioned to the supine position.  The patient tolerated the procedure well and was taken to the recovery room in stable condition.  Dr. Garay was scrubbed and present for the entire procedure.      Janet Allan MD   Resident Physician, PGY1  Obstetrics, Gynecology, and Women's Health    I was present and scrubbed for entirety of the surgical case and  agree with note as edited to reflect findings.      JANET GARAY

## 2019-04-17 NOTE — ANESTHESIA CARE TRANSFER NOTE
Patient: April Miller    Procedure(s):  Leep  Hysteroscopy With Polypectomy    Diagnosis: HERVE Grade 2-3, Endometrial Polyp  Diagnosis Additional Information: No value filed.    Anesthesia Type:   No value filed.     Note:  Airway :Room Air  Patient transferred to:Phase II  Handoff Report: Identifed the Patient, Identified the Reponsible Provider, Reviewed the pertinent medical history, Discussed the surgical course, Reviewed Intra-OP anesthesia mangement and issues during anesthesia, Set expectations for post-procedure period and Allowed opportunity for questions and acknowledgement of understanding      Vitals: (Last set prior to Anesthesia Care Transfer)    CRNA VITALS  4/17/2019 1152 - 4/17/2019 1226      4/17/2019             Pulse:  79    Ht Rate:  77    SpO2:  100 %                Electronically Signed By: SINAN Griffin CRNA  April 17, 2019  12:26 PM

## 2019-04-17 NOTE — DISCHARGE INSTRUCTIONS
Same-Day Surgery   Adult Discharge Orders & Instructions     For 24 hours after surgery:  1. Get plenty of rest.  A responsible adult must stay with you for at least 24 hours after you leave the hospital.   2. Pain medication can slow your reflexes. Do not drive or use heavy equipment.  If you have weakness or tingling, don't drive or use heavy equipment until this feeling goes away.  3. Mixing alcohol and pain medication can cause dizziness and slow your breathing. It can even be fatal. Do not drink alcohol while taking pain medication.  4. Avoid strenuous or risky activities.  Ask for help when climbing stairs.   5. You may feel lightheaded.  If so, sit for a few minutes before standing.  Have someone help you get up.   6. If you have nausea (feel sick to your stomach), drink only clear liquids such as apple juice, ginger ale, broth or 7-Up.  Rest may also help.  Be sure to drink enough fluids.  Move to a regular diet as you feel able. Take pain medications with a small amount of solid food, such as toast or crackers, to avoid nausea.   7. A slight fever is normal. Call the doctor if your fever is over 100 F (37.7 C) (taken under the tongue) or lasts longer than 24 hours.  8. You may have a dry mouth, muscle aches, trouble sleeping or a sore throat.  These symptoms should go away after 24 hours.  9. Do not make important or legal decisions.   Pain Management:      1. Take pain medication (if prescribed) for pain as directed by your physician.        2. WARNING: If the pain medication you have been prescribed contains Tylenol  (acetaminophen), DO NOT take additional doses of Tylenol (acetaminophen).     Call your doctor for any of the followin.  Signs of infection (fever, growing tenderness at the surgery site, severe pain, a large amount of drainage or bleeding, foul-smelling drainage, redness, swelling).    2.  It has been over 8 to 10 hours since surgery and you are still not able to urinate (pee).    3.   Headache for over 24 hours.    4.  Numbness, tingling or weakness the day after surgery (if you had spinal anesthesia).  To contact a doctor, call _____________________________________ or:      [phone] and ask for the Resident On Call for:          __________________________________________ (answered 24 hours a day)      Emergency Department:  [facility]: [phone]  [facility]: [phone]               Rev. 10/2014

## 2019-04-22 LAB — COPATH REPORT: NORMAL

## 2020-02-11 ENCOUNTER — OFFICE VISIT (OUTPATIENT)
Dept: OBGYN | Facility: CLINIC | Age: 45
End: 2020-02-11
Payer: COMMERCIAL

## 2020-02-11 VITALS
DIASTOLIC BLOOD PRESSURE: 79 MMHG | SYSTOLIC BLOOD PRESSURE: 127 MMHG | BODY MASS INDEX: 24.11 KG/M2 | HEIGHT: 66 IN | WEIGHT: 150 LBS

## 2020-02-11 DIAGNOSIS — N92.6 IRREGULAR MENSES: ICD-10-CM

## 2020-02-11 DIAGNOSIS — Z01.419 ENCOUNTER FOR GYNECOLOGICAL EXAMINATION WITHOUT ABNORMAL FINDING: Primary | ICD-10-CM

## 2020-02-11 DIAGNOSIS — D06.9 CARCINOMA IN SITU OF CERVIX, UNSPECIFIED LOCATION: ICD-10-CM

## 2020-02-11 DIAGNOSIS — Z13.220 SCREENING FOR LIPID DISORDERS: ICD-10-CM

## 2020-02-11 PROCEDURE — 99396 PREV VISIT EST AGE 40-64: CPT | Performed by: OBSTETRICS & GYNECOLOGY

## 2020-02-11 PROCEDURE — 88175 CYTOPATH C/V AUTO FLUID REDO: CPT | Performed by: OBSTETRICS & GYNECOLOGY

## 2020-02-11 PROCEDURE — 87624 HPV HI-RISK TYP POOLED RSLT: CPT | Performed by: OBSTETRICS & GYNECOLOGY

## 2020-02-11 PROCEDURE — 99213 OFFICE O/P EST LOW 20 MIN: CPT | Mod: 25 | Performed by: OBSTETRICS & GYNECOLOGY

## 2020-02-11 SDOH — HEALTH STABILITY: MENTAL HEALTH: HOW OFTEN DO YOU HAVE 6 OR MORE DRINKS ON ONE OCCASION?: NEVER

## 2020-02-11 SDOH — HEALTH STABILITY: MENTAL HEALTH: HOW MANY STANDARD DRINKS CONTAINING ALCOHOL DO YOU HAVE ON A TYPICAL DAY?: 1 OR 2

## 2020-02-11 SDOH — HEALTH STABILITY: MENTAL HEALTH: HOW OFTEN DO YOU HAVE A DRINK CONTAINING ALCOHOL?: 2-4 TIMES A MONTH

## 2020-02-11 ASSESSMENT — MIFFLIN-ST. JEOR: SCORE: 1342.15

## 2020-02-11 NOTE — PROGRESS NOTES
April is a 44 year old  female who presents for annual exam.     Menses are regular q 28-30 days and normal lasting 4 days.  Menses flow: normal.  Patient's last menstrual period was 2020.. Using none for contraception.  She is currently considering pregnancy.  Besides routine health maintenance, she has no other health concerns today .  Here with spouse and is hoping to have another baby.  Last pregnancy 4 years ago and this is same father baby.  Since we did her LEEP and hysteroscopy with polypectomy 2019, menses have been very regular and can feel ovulation in the middle of the cycle.  They have been timing intercourse and not using birth control.  Currently cycle day 6.  GYNECOLOGIC HISTORY:  Menarche:   April is sexually active with 1male partner(s) and is currently in monogamous relationship.    History sexually transmitted infections:HPV  STI testing offered?  Declined  BAIRON exposure: Unknown  History of abnormal Pap smear:yesnegative HPV co-testing recommended  Family history of breast CA: No  Family history of uterine/ovarian CA: No    Family history of colon CA: No    HEALTH MAINTENANCE:  Cholesterol: (No results found for: CHOL History of abnormal lipids: No  Mammo: na . History of abnormal Mammo: No.  Regular Self Breast Exams: Yes  Calcium/Vitamin D intake: source:  dietary supplement(s) Adequate? Yes  TSH: (No results found for: TSH )  Pap; (  Lab Results   Component Value Date    PAP HSIL 01/15/2019    )    HISTORY:  OB History    Para Term  AB Living   8 4 4 0 4 4   SAB TAB Ectopic Multiple Live Births   2 2 0 0 2      # Outcome Date GA Lbr Tom/2nd Weight Sex Delivery Anes PTL Lv   8 Term     F Vag-Spont   OTONIEL   7 Term      Vag-Spont      6 Term      Vag-Spont      5 Term     M Vag-Spont   OTONIEL   4 SAB            3 SAB            2 TAB            1 TAB              Past Medical History:   Diagnosis Date     Abnormal Pap smear of cervix 01/15/2019    See  problem list     Cervical high risk HPV (human papillomavirus) test positive 01/15/2019    See problem list     Past Surgical History:   Procedure Laterality Date     CONIZATION LEEP N/A 4/17/2019    Procedure: Leep;  Surgeon: Janet Garay MD;  Location: [facility] OR     OPERATIVE HYSTEROSCOPY WITH MORCELLATOR N/A 4/17/2019    Procedure: Hysteroscopy With Polypectomy;  Surgeon: Janet Garay MD;  Location: [facility] OR     Family History   Problem Relation Age of Onset     Brain Cancer Mother      Social History     Socioeconomic History     Marital status:      Spouse name: Not on file     Number of children: Not on file     Years of education: Not on file     Highest education level: Not on file   Occupational History     Not on file   Social Needs     Financial resource strain: Not on file     Food insecurity:     Worry: Not on file     Inability: Not on file     Transportation needs:     Medical: Not on file     Non-medical: Not on file   Tobacco Use     Smoking status: Former Smoker     Types: Cigarettes     Smokeless tobacco: Never Used   Substance and Sexual Activity     Alcohol use: Yes     Comment: wine on weekends     Drug use: No     Sexual activity: Yes     Partners: Male     Birth control/protection: None   Lifestyle     Physical activity:     Days per week: Not on file     Minutes per session: Not on file     Stress: Not on file   Relationships     Social connections:     Talks on phone: Not on file     Gets together: Not on file     Attends Advent service: Not on file     Active member of club or organization: Not on file     Attends meetings of clubs or organizations: Not on file     Relationship status: Not on file     Intimate partner violence:     Fear of current or ex partner: Not on file     Emotionally abused: Not on file     Physically abused: Not on file     Forced sexual activity: Not on file   Other Topics Concern     Not on file   Social History Narrative     Not on file  "      Current Outpatient Medications:      Calcium-Magnesium-Zinc 333-133-5 MG TABS per tablet, , Disp: , Rfl:      cholecalciferol (VITAMIN D3) 5000 units TABS tablet, , Disp: , Rfl:      cyanocobalamin (VITAMIN B-12) 1000 MCG SUBL sublingual tablet, , Disp: , Rfl:      ferrous sulfate (FEROSUL) 325 (65 Fe) MG tablet, Take 325 mg by mouth, Disp: , Rfl:      MULTIPLE VITAMINS-MINERALS ER PO, Womens ALIVE, Disp: , Rfl:      Allergies   Allergen Reactions     Beef-Derived Products Swelling     Food      Milk Protein Extract Other (See Comments)     abd pain       Past medical, surgical, social and family history were reviewed and updated in EPIC.  .    EXAM:  Ht 1.676 m (5' 6\")   Wt 68 kg (150 lb)   LMP 02/06/2020   BMI 24.21 kg/m     BMI: Body mass index is 24.21 kg/m .  Constitutional: healthy, alert and no distress  Head: Normocephalic. No masses, lesions, tenderness or abnormalities  Neck: Neck supple. Trachea midline. No adenopathy. Thyroid symmetric, normal size.   Cardiovascular: RRR.   Respiratory: Negative.   Breast: Breasts reveal mild symmetric fibrocystic densities, but there are no dominant, discrete, fixed or suspicious masses found.  Gastrointestinal: Abdomen soft, non-tender, non-distended. No masses, organomegaly.  :  Vulva:  No external lesions, normal female hair distribution, no inguinal adenopathy.    Urethra:  Midline, non-tender, well supported, no discharge  Vagina:  Moist, pink, no abnormal discharge, no lesions  Uterus:  Normal size , non-tender, freely mobile  Ovaries:  No masses appreciated, non-tender, mobile  Rectal Exam: deferred  Musculoskeletal: extremities normal  Skin: no suspicious lesions or rashes  Psychiatric: Affect appropriate, cooperative,mentation appears normal.     COUNSELING:   Reviewed preventive health counseling, as reflected in patient instructions       Family planning       Folic Acid Counseling   reports that she has quit smoking. Her smoking use included " cigarettes. She has never used smokeless tobacco.    Body mass index is 24.21 kg/m .    FRAX Risk Assessment    ASSESSMENT:  44 year old female with satisfactory annual exam  (Z01.419) Encounter for gynecological examination without abnormal finding  (primary encounter diagnosis)  Comment: Trying for pregnancy  Plan: CBC with platelets        Return to clinic for lab    (Z13.220) Screening for lipid disorders  Comment: Not done for many years  Plan: Lipid Profile        Return to clinic for lab    (N92.6) Irregular menses  Comment: AMA trying for pregnancy more than 6 months  Plan: Anti-Mullerian hormone, TSH with free T4         reflex, Prolactin, Progesterone        We will check labs in the luteal phase to make sure she is ovulating and AMH level.  Discussed possible cycle day 3 labs and possible need for semen analysis and HSG.  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    Janet Garay MD

## 2020-02-11 NOTE — LETTER
February 18, 2020 April Sanya  [address]  [place] [address]      Dear ,      This letter is in regards to your recent cervical cancer screening (Pap smear and HPV test).    Your Pap smear result was reported as ASCUS or Atypical Squamous Cells of Undetermined Significance. This means that there were mildly abnormal cells found in the sample that we collected from your cervix. The vast majority of patients with this result do not have significant cervical abnormalities.     Your cervical sample was also tested for the presence of Human Papillomavirus (HPV). Your HPV test is NEGATIVE for high risk HPV, meaning that no HPV was found at this time.     Over time, your body can get rid of these abnormal cells, so it is recommended that you repeat your Pap smear and HPV test in 1 year.    If you have additional questions regarding this result, please call our registered nurse, Nuvia at [phone].    Please continue to be seen every year for an annual physical exam and other preventative tests.         Sincerely,    ALLY SMILEY MD./  Nuvia Hollins RN-Pap Tracking

## 2020-02-11 NOTE — PATIENT INSTRUCTIONS
Make lab only appointment for 2/20/20 anytime any [facility]    (checking eggs, ovulation, cholesterol)    Start baby aspirin daily 81 mg    Lifestyle recommendations when attempting pregnancy    Limit caffeine less than 300 mg/day  Alcohol less than 2 drinks per day  Exercise is fine, regular and moderate  Take multivitamin or prenatal vitamin daily  (Folic Acid 400 mcg per day)

## 2020-02-13 LAB
COPATH REPORT: ABNORMAL
PAP: ABNORMAL

## 2020-02-14 LAB
FINAL DIAGNOSIS: NORMAL
HPV HR 12 DNA CVX QL NAA+PROBE: NEGATIVE
HPV16 DNA SPEC QL NAA+PROBE: NEGATIVE
HPV18 DNA SPEC QL NAA+PROBE: NEGATIVE
SPECIMEN DESCRIPTION: NORMAL
SPECIMEN SOURCE CVX/VAG CYTO: NORMAL

## 2020-02-15 PROBLEM — D06.9 CIN III (CERVICAL INTRAEPITHELIAL NEOPLASIA III): Status: ACTIVE | Noted: 2019-01-15

## 2020-02-20 DIAGNOSIS — Z01.419 ENCOUNTER FOR GYNECOLOGICAL EXAMINATION WITHOUT ABNORMAL FINDING: ICD-10-CM

## 2020-02-20 DIAGNOSIS — Z13.220 SCREENING FOR LIPID DISORDERS: ICD-10-CM

## 2020-02-20 DIAGNOSIS — N92.6 IRREGULAR MENSES: ICD-10-CM

## 2020-02-20 LAB
CHOLEST SERPL-MCNC: 130 MG/DL
ERYTHROCYTE [DISTWIDTH] IN BLOOD BY AUTOMATED COUNT: 12.9 % (ref 10–15)
HCT VFR BLD AUTO: 41 % (ref 35–47)
HDLC SERPL-MCNC: 76 MG/DL
HGB BLD-MCNC: 12.7 G/DL (ref 11.7–15.7)
LDLC SERPL CALC-MCNC: 41 MG/DL
MCH RBC QN AUTO: 29.5 PG (ref 26.5–33)
MCHC RBC AUTO-ENTMCNC: 31 G/DL (ref 31.5–36.5)
MCV RBC AUTO: 95 FL (ref 78–100)
NONHDLC SERPL-MCNC: 54 MG/DL
PLATELET # BLD AUTO: 186 10E9/L (ref 150–450)
PROGEST SERPL-MCNC: 1.6 NG/ML
PROLACTIN SERPL-MCNC: 8 UG/L (ref 3–27)
RBC # BLD AUTO: 4.31 10E12/L (ref 3.8–5.2)
TRIGL SERPL-MCNC: 64 MG/DL
TSH SERPL DL<=0.005 MIU/L-ACNC: 0.94 MU/L (ref 0.4–4)
WBC # BLD AUTO: 4.3 10E9/L (ref 4–11)

## 2020-02-20 PROCEDURE — 85027 COMPLETE CBC AUTOMATED: CPT | Performed by: OBSTETRICS & GYNECOLOGY

## 2020-02-20 PROCEDURE — 84146 ASSAY OF PROLACTIN: CPT | Performed by: OBSTETRICS & GYNECOLOGY

## 2020-02-20 PROCEDURE — 84144 ASSAY OF PROGESTERONE: CPT | Performed by: OBSTETRICS & GYNECOLOGY

## 2020-02-20 PROCEDURE — 80061 LIPID PANEL: CPT | Performed by: OBSTETRICS & GYNECOLOGY

## 2020-02-20 PROCEDURE — 83520 IMMUNOASSAY QUANT NOS NONAB: CPT | Mod: 90 | Performed by: OBSTETRICS & GYNECOLOGY

## 2020-02-20 PROCEDURE — 84443 ASSAY THYROID STIM HORMONE: CPT | Performed by: OBSTETRICS & GYNECOLOGY

## 2020-02-20 PROCEDURE — 36415 COLL VENOUS BLD VENIPUNCTURE: CPT | Performed by: OBSTETRICS & GYNECOLOGY

## 2020-02-20 PROCEDURE — 99000 SPECIMEN HANDLING OFFICE-LAB: CPT | Performed by: OBSTETRICS & GYNECOLOGY

## 2020-02-22 LAB — MIS SERPL-MCNC: 0.34 NG/ML

## 2020-06-02 ENCOUNTER — VIRTUAL VISIT (OUTPATIENT)
Dept: FAMILY MEDICINE | Facility: OTHER | Age: 45
End: 2020-06-02

## 2020-06-02 DIAGNOSIS — Z20.822 SUSPECTED COVID-19 VIRUS INFECTION: Primary | ICD-10-CM

## 2020-06-02 PROCEDURE — 99207 ZZC NO BILLABLE SERVICE THIS VISIT: CPT

## 2020-06-02 PROCEDURE — 87635 SARS-COV-2 COVID-19 AMP PRB: CPT | Mod: 90 | Performed by: FAMILY MEDICINE

## 2020-06-02 PROCEDURE — 99000 SPECIMEN HANDLING OFFICE-LAB: CPT | Performed by: FAMILY MEDICINE

## 2020-06-02 NOTE — PROGRESS NOTES
"Date: 2020 07:35:39  Clinician: Malachi Garcia  Clinician NPI: [id number]  Patient: Maxine Segundo  Patient : 1975  Patient Address: [address] [place] [id number]  Patient Phone: [phone]  Visit Protocol: URI  Patient Summary:  April is a 45 year old ( : 1975 ) female who initiated a Visit for COVID-19 (Coronavirus) evaluation and screening. When asked the question \"Please sign me up to receive news, health information and promotions from [employer].\", April responded \"No\".    April states her symptoms started 1-2 days ago.   Her symptoms consist of ageusia, enlarged lymph nodes, malaise, myalgia, and a cough. She is experiencing mild difficulty breathing with activities but can speak normally in full sentences. April also feels feverish.   Symptom details     Cough: April coughs every 5-10 minutes and her cough is not more bothersome at night. Phlegm does not come into her throat when she coughs. She does not believe her cough is caused by post-nasal drip.     Temperature: Her current temperature is 100.7 degrees Fahrenheit. April has had a temperature over 100 degrees Fahrenheit for 1-2 days.      April denies having wheezing, nausea, teeth pain, diarrhea, sore throat, anosmia, facial pain or pressure, nasal congestion, vomiting, rhinitis, ear pain, headache, and chills. She also denies having recent facial or sinus surgery in the past 60 days and taking antibiotic medication for the symptoms.   Precipitating events  She has not recently been exposed to someone with influenza. April has been in close contact with the following high risk individuals: children under the age of 5.   Pertinent COVID-19 (Coronavirus) information  In the past 14 days, April has not worked in a congregate living setting.   She does not work or volunteer as healthcare worker or a  and does not work or volunteer in a healthcare facility.   April also has not lived in a congregate living " setting in the past 14 days. She does not live with a healthcare worker.   April has not had a close contact with a laboratory-confirmed COVID-19 patient within 14 days of symptom onset.   Pertinent medical history  April typically gets a yeast infection when she takes antibiotics. She has used fluconazole (Diflucan) to treat previous yeast infections. 1 dose of fluconazole (Diflucan) has typically been sufficient for symptoms to resolve in the past.   April needs a return to work/school note.   Weight: 150 lbs   April does not smoke or use smokeless tobacco.   She denies pregnancy and denies breastfeeding. She has menstruated in the past month.   Weight: 150 lbs    MEDICATIONS: No current medications, ALLERGIES: NKDA  Clinician Response:  Dear April, April,  You symptoms are typical for covid. We can now have you tested to clarify your symptoms. You need to self-quarantine until your test results are back, typically 2-3 days after the test is completed. Please call [phone] to schedule testing.  The following information will discuss return to work:       Your symptoms show that you may have coronavirus (COVID-19). This illness can cause fever, cough and trouble breathing. Many people get a mild case and get better on their own. Some people can get very sick.  What should I do?  We would like to test you for this virus. This will be a curbside test done outside the clinic.  Please call [phone] to schedule your visit. Explain that you were referred by [facility] to have a COVID-19 test. Be ready to share your [facility] visit ID number.  Starting now:  Stay at least 6 feet away from others. (If someone will drive you to your test, stay in the backseat, as far away from the  as you can.)   Don't go to work, school or anywhere else. When it's time for your test, go straight to the testing site. Don't make any stops on the way there or back.   Wash your hands and face often. Use soap and water.   Cover your  "mouth and nose with a mask, tissue or washcloth.   Don't touch anyone. No hugging, kissing or handshakes.  While at home   Stay home and away from others (self-isolate) until:  You've had no fever---and no medicine that reduces fever---for 3 full days (72 hours). And...  Your other symptoms have gotten better. For example, your cough or breathing has improved. And...  At least 10 days have passed since your symptoms started.  During this time:  Stay in your own room (and use your own bathroom), if you can.  Don't go to work, school or anywhere else.  Stay away from others in your home. No hugging, kissing or shaking hands.  Don't let anyone visit.  Cover your mouth and nose with a mask, tissue or washcloth to avoid spreading germs.  Clean \"high touch\" surfaces often (doorknobs, counters, handles, etc.). Use a household cleaning spray or wipes.  Wash your hands and face often. Use soap and water.  How can I take care of myself?  1. Get lots of rest. Drink extra fluids (unless your doctor has told you not to).  2. Take Tylenol (acetaminophen) for fever or pain. If you have liver or kidney problems, ask your family doctor if it's okay to take Tylenol.  Adults can take either:   650 mg (two 325 mg pills) every 4 to 6 hours, or...  1,000 mg (two 500 mg pills) every 8 hours as needed.   Note: Don't take more than 3,000 mg in one day.   Acetaminophen is found in many medicines (both prescribed and over-the-counter medicines). Read all labels to be sure you don't take too much.   For children, check the Tylenol bottle for the right dose. The dose is based on the child's age or weight.  3. If you have other health problems (like cancer, heart failure, an organ transplant or severe kidney disease): Call your specialty clinic if you don't feel better in the next 2 days.  4. Know when to call 911: If your breathing is so bad that it keeps you from doing normal activities, call 911 or go to the emergency room. Tell them that " you've been staying home and may have COVID-19.  5. Sign up for [employer]. We know it's scary to hear that you might have COVID-19. We want to track your symptoms to make sure you're okay over the next 2 weeks. Please look for an email from [employer]---this is a free, online program that we'll use to keep in touch. To sign up, follow the link in the email. Learn more at [email or web address].  6. The following will serve as your written order for this Covid Test ordered by me for the indication of suspected Covid [Z20.828]: The test will be ordered in [employer], our electronic health record after you are scheduled and will show as ordered and authorized by Skinny Luciano MD   Order: Covid-19 (Coronavirus) PCR for SYMPTOMATIC testing from [facility]  Where can I get more information?  To learn more about COVID-19 and how to care for yourself at home, please visit the CDC website at https://www.cdc.gov/coronavirus/2019-ncov/about/steps-when-sick.html.  For more about your care at [facility], please visit [email or web address]  If you'd like to be part of a COVID-19 clinical trial (research study) at the [facility], go to [email or web address] for details.      Diagnosis: Cough  Diagnosis ICD: R05

## 2020-06-03 LAB
SARS-COV-2 RNA SPEC QL NAA+PROBE: NOT DETECTED
SPECIMEN SOURCE: NORMAL

## 2020-06-29 ENCOUNTER — OFFICE VISIT (OUTPATIENT)
Dept: URGENT CARE | Facility: URGENT CARE | Age: 45
End: 2020-06-29
Payer: COMMERCIAL

## 2020-06-29 ENCOUNTER — ANCILLARY PROCEDURE (OUTPATIENT)
Dept: GENERAL RADIOLOGY | Facility: CLINIC | Age: 45
End: 2020-06-29
Attending: NURSE PRACTITIONER
Payer: COMMERCIAL

## 2020-06-29 VITALS
WEIGHT: 155 LBS | BODY MASS INDEX: 24.33 KG/M2 | HEART RATE: 80 BPM | RESPIRATION RATE: 14 BRPM | TEMPERATURE: 98.6 F | DIASTOLIC BLOOD PRESSURE: 80 MMHG | HEIGHT: 67 IN | SYSTOLIC BLOOD PRESSURE: 126 MMHG

## 2020-06-29 DIAGNOSIS — S52.335A CLOSED NONDISPLACED OBLIQUE FRACTURE OF SHAFT OF LEFT RADIUS, INITIAL ENCOUNTER: Primary | ICD-10-CM

## 2020-06-29 DIAGNOSIS — M79.632 PAIN OF LEFT FOREARM: ICD-10-CM

## 2020-06-29 DIAGNOSIS — Z32.00 POSSIBLE PREGNANCY: ICD-10-CM
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
## 2020-06-29 LAB — HCG UR QL: NEGATIVE

## 2020-06-29 PROCEDURE — 99214 OFFICE O/P EST MOD 30 MIN: CPT | Mod: 25 | Performed by: NURSE PRACTITIONER

## 2020-06-29 PROCEDURE — 81025 URINE PREGNANCY TEST: CPT | Performed by: NURSE PRACTITIONER

## 2020-06-29 PROCEDURE — 29125 APPL SHORT ARM SPLINT STATIC: CPT | Mod: LT | Performed by: NURSE PRACTITIONER

## 2020-06-29 PROCEDURE — 73090 X-RAY EXAM OF FOREARM: CPT | Mod: LT

## 2020-06-29 ASSESSMENT — MIFFLIN-ST. JEOR: SCORE: 1375.71

## 2020-06-29 NOTE — PATIENT INSTRUCTIONS
Patient Education     Upper Extremity Fracture    You have a break (fracture) of the arm, wrist, or hand. This may be a small crack in the bone. Or it may be a major break, with the broken parts pushed out of position. Most fractures will heal without surgery. But you may need surgery if the bones are far out of place or if the break is near the elbow. Treatment is with a special sling called a shoulder immobilizer, or a splint or cast, depending on the type of fracture and where the fracture is located. This fracture takes 4 to 6 weeks or longer to heal. The cast may need to be changed in 2 to 3 weeks as swelling goes down.  Home care  Follow these guidelines when caring for yourself:    If you were given a shoulder immobilizer, leave it in place. This will support the injured arm at your side. This is the best position for bone healing. The shoulder immobilizer can be adjusted. If it becomes loose, adjust it so that your forearm is level with the ground (horizontal). Your hand should be level with your elbow.    Put an ice pack on the injured area. Do this for 20 minutes every 1 to 2 hours the first day for pain relief. You can make an ice pack by wrapping a plastic bag of ice cubes in a thin towel. As the ice melts, be careful that the cast/splint/sling doesn t get wet. You can put the ice pack inside the sling and directly over the splint or cast. Continue using the ice pack 3 to 4 times a day for the next 2 days. Then use the ice pack as needed to ease pain and swelling.    Keep the cast, splint, or sling completely dry at all times. Bathe with your cast, splint, or sling out of the water. Protect it with a large plastic bag, rubber-banded or taped at the top end. If a fiberglass cast, splint, or sling gets wet, you can dry it with a hair dryer.    You may use acetaminophen or ibuprofen to control pain, unless another pain medicine was prescribed. If you have chronic liver or kidney disease, talk with your  healthcare provider before using these medicines. Also talk with your provider if you ve had a stomach ulcer or gastrointestinal bleeding.    Don t put creams or objects under the cast if you have itching.  Follow-up care  Follow up with your healthcare provider as advised. This is to make sure the bone is healing the way it should.  X-rays may be taken. You will be told of any new findings that may affect your care.  When to seek medical advice  Call your health care provider right away if any of these occur:    The cast or splint cracks    The plaster cast or splint becomes wet or soft    The fiberglass cast or splint stays wet for more than 24 hours    Bad odor from the cast or wound fluid stains the cast    Tightness or pain under the cast or splint gets worse    Fingers become swollen, cold, blue, numb, or tingly    You can t move your fingers    Skin around cast or splint becomes red or irritated    Fever of 100.4 F (38 C) or higher, or chills as directed by your healthcare provider  Date Last Reviewed: 2/1/2017 2000-2019 [employer]. [address] [place] [address]. All rights reserved. This information is not intended as a substitute for professional medical care. Always follow your healthcare professional's instructions.           Patient Education     Discharge Instructions: Caring for Your Splint  You will be going home with a splint. This is sometimes called a removable cast. A splint helps your body heal by holding your injured bones or joints in place. Take good care of your splint. A damaged splint can keep your injury from healing well. If your splint becomes damaged or loses its shape, you may need to replace it.   You have a broken ___________________ bone.  This bone is located in your ____________.   Home care    Wear your splint according to your doctor s instructions.    Keep the splint dry at all times. Bathe with your splint well out of the water. You can hold the  splint outside the tub or shower when bathing. Protect it with a large plastic bag closed at the top end with a rubber band. Use two layers of plastic to help keep the splint dry. Or you can buy a waterproof shield.    If a splint gets wet, dry it with a hair dryer on the  cool  setting. Don t use the warm or hot setting, because those settings can burn your skin.    Always keep the splint clean and away from dirt.    Wash the Velcro straps and inner cloth sleeve (stockinet) with soapy water and air dry.    Keep your splint away from open flames.    Don t expose your splint to heat, space heaters, or prolonged sunlight. Excessive heat will cause the splint to change shape.    Don t cut or tear the splint.     Exercise all the nearby joints not kept still by the splint. If you have a long leg splint, exercise your hip joint and your toes. If you have an arm splint, exercise your shoulder, elbow, thumb, and fingers.    Elevate the part of your body that is in the splint. This helps reduce swelling.  Follow-up care  Make a follow-up appointment with your healthcare provider, or as advised.  When to call your healthcare provider  Call your healthcare provider right away if you have any of these:    Tingling or numbness in the affected area    Severe pain that cannot be relieved with medicine    Cast that feels too tight or too loose    Swelling, coldness, or blue-gray color in the fingers or toes    Cast that is damaged, cracked, or has rough edges that hurt    Pressure sores or red marks that don t go away within 1 hour after removing the splint    Blisters   Date Last Reviewed: 7/1/2016 2000-2019 [employer]. [address] [place] [address]. All rights reserved. This information is not intended as a substitute for professional medical care. Always follow your healthcare professional's instructions.

## 2020-06-29 NOTE — PROGRESS NOTES
"SUBJECTIVE:   Maxine Segundo is a 45 year old female presenting with a chief complaint of left forearm pain since walking her dog last night, tripping and falling face first onto the ground and her left side. She has had pain in the left forearm since and it has become more swollen over the day.    Onset of symptoms was 16 hour(s) ago.  Course of illness is sudden onset.    Severity moderate  Previous Treatment none      Past Medical History:   Diagnosis Date     Abnormal Pap smear of cervix 01/15/2019, 2/11/20    See problem list     Cervical high risk HPV (human papillomavirus) test positive 01/15/2019    See problem list     Current Outpatient Medications   Medication Sig Dispense Refill     Calcium-Magnesium-Zinc 333-133-5 MG TABS per tablet        cholecalciferol (VITAMIN D3) 5000 units TABS tablet        cyanocobalamin (VITAMIN B-12) 1000 MCG SUBL sublingual tablet        ferrous sulfate (FEROSUL) 325 (65 Fe) MG tablet Take 325 mg by mouth       MULTIPLE VITAMINS-MINERALS ER PO Womens ALIVE       Social History     Tobacco Use     Smoking status: Former Smoker     Types: Cigarettes     Smokeless tobacco: Never Used   Substance Use Topics     Alcohol use: Yes     Frequency: 2-4 times a month     Drinks per session: 1 or 2     Binge frequency: Never     Comment: wine on weekends     Family History   Problem Relation Age of Onset     Brain Cancer Mother         ROS:7 point ROS neg other than the symptoms noted above in the HPI.     OBJECTIVE  /80   Pulse 80   Temp 98.6  F (37  C) (Oral)   Resp 14   Ht 1.702 m (5' 7\")   Wt 70.3 kg (155 lb)   LMP 06/15/2020   Breastfeeding No   BMI 24.28 kg/m        GENERAL APPEARANCE: healthy appearing, alert     NECK: no adenopathy or asymmetry, thyroid normal to palpation     MS: extremities normal- no gross deformities noted; normal muscle tone.     SKIN: small abrasions to the upper lip     NEURO: Normal strength and tone, mentation intact and speech normal     " PSYCH: normal thought process; no significant mood disturbance      Labs:  Results for orders placed or performed in visit on 06/29/20 (from the past 24 hour(s))   HCG Qual, Urine ([id number])   Result Value Ref Range    HCG Qual Urine Negative NEG^Negative     Xray of the left forearm shows a non displaced oblique radial shaft fracture as read by this provider.    Radiologist did not feel there was a fracture but instead the area in question was a tunnel for a vessel that was showing up. This provider still feels this may be a fracture given the point at which patient is tender and the nature of the injury. Will have her follow-up with orthopedics to review and repeat xray.    Sugar tong fiberglass splint applied to left forearm without difficulty. CMS to fingertips was normal 15 minutes after splint was applied.    ASSESSMENT/PLAN:      ICD-10-CM    1. Closed nondisplaced oblique fracture of shaft of left radius, initial encounter  S52.335A Orthopedic & Spine  Referral   2. Pain of left forearm  M79.632 XR Forearm Left 2 Views   3. Fall, initial encounter  W19.XXXA XR Forearm Left 2 Views   4. Possible pregnancy  Z32.00 HCG Qual, Urine ([id number])        Follow Up:      Patient Instructions       Patient Education     Upper Extremity Fracture    You have a break (fracture) of the arm, wrist, or hand. This may be a small crack in the bone. Or it may be a major break, with the broken parts pushed out of position. Most fractures will heal without surgery. But you may need surgery if the bones are far out of place or if the break is near the elbow. Treatment is with a special sling called a shoulder immobilizer, or a splint or cast, depending on the type of fracture and where the fracture is located. This fracture takes 4 to 6 weeks or longer to heal. The cast may need to be changed in 2 to 3 weeks as swelling goes down.  Home care  Follow these guidelines when caring for yourself:    If you were given a shoulder  immobilizer, leave it in place. This will support the injured arm at your side. This is the best position for bone healing. The shoulder immobilizer can be adjusted. If it becomes loose, adjust it so that your forearm is level with the ground (horizontal). Your hand should be level with your elbow.    Put an ice pack on the injured area. Do this for 20 minutes every 1 to 2 hours the first day for pain relief. You can make an ice pack by wrapping a plastic bag of ice cubes in a thin towel. As the ice melts, be careful that the cast/splint/sling doesn t get wet. You can put the ice pack inside the sling and directly over the splint or cast. Continue using the ice pack 3 to 4 times a day for the next 2 days. Then use the ice pack as needed to ease pain and swelling.    Keep the cast, splint, or sling completely dry at all times. Bathe with your cast, splint, or sling out of the water. Protect it with a large plastic bag, rubber-banded or taped at the top end. If a fiberglass cast, splint, or sling gets wet, you can dry it with a hair dryer.    You may use acetaminophen or ibuprofen to control pain, unless another pain medicine was prescribed. If you have chronic liver or kidney disease, talk with your healthcare provider before using these medicines. Also talk with your provider if you ve had a stomach ulcer or gastrointestinal bleeding.    Don t put creams or objects under the cast if you have itching.  Follow-up care  Follow up with your healthcare provider as advised. This is to make sure the bone is healing the way it should.  X-rays may be taken. You will be told of any new findings that may affect your care.  When to seek medical advice  Call your health care provider right away if any of these occur:    The cast or splint cracks    The plaster cast or splint becomes wet or soft    The fiberglass cast or splint stays wet for more than 24 hours    Bad odor from the cast or wound fluid stains the cast    Tightness  or pain under the cast or splint gets worse    Fingers become swollen, cold, blue, numb, or tingly    You can t move your fingers    Skin around cast or splint becomes red or irritated    Fever of 100.4 F (38 C) or higher, or chills as directed by your healthcare provider  Date Last Reviewed: 2/1/2017 2000-2019 [employer]. [address] [place] [address]. All rights reserved. This information is not intended as a substitute for professional medical care. Always follow your healthcare professional's instructions.           Patient Education     Discharge Instructions: Caring for Your Splint  You will be going home with a splint. This is sometimes called a removable cast. A splint helps your body heal by holding your injured bones or joints in place. Take good care of your splint. A damaged splint can keep your injury from healing well. If your splint becomes damaged or loses its shape, you may need to replace it.   You have a broken ___________________ bone.  This bone is located in your ____________.   Home care    Wear your splint according to your doctor s instructions.    Keep the splint dry at all times. Bathe with your splint well out of the water. You can hold the splint outside the tub or shower when bathing. Protect it with a large plastic bag closed at the top end with a rubber band. Use two layers of plastic to help keep the splint dry. Or you can buy a waterproof shield.    If a splint gets wet, dry it with a hair dryer on the  cool  setting. Don t use the warm or hot setting, because those settings can burn your skin.    Always keep the splint clean and away from dirt.    Wash the Velcro straps and inner cloth sleeve (stockinet) with soapy water and air dry.    Keep your splint away from open flames.    Don t expose your splint to heat, space heaters, or prolonged sunlight. Excessive heat will cause the splint to change shape.    Don t cut or tear the splint.     Exercise all  the nearby joints not kept still by the splint. If you have a long leg splint, exercise your hip joint and your toes. If you have an arm splint, exercise your shoulder, elbow, thumb, and fingers.    Elevate the part of your body that is in the splint. This helps reduce swelling.  Follow-up care  Make a follow-up appointment with your healthcare provider, or as advised.  When to call your healthcare provider  Call your healthcare provider right away if you have any of these:    Tingling or numbness in the affected area    Severe pain that cannot be relieved with medicine    Cast that feels too tight or too loose    Swelling, coldness, or blue-gray color in the fingers or toes    Cast that is damaged, cracked, or has rough edges that hurt    Pressure sores or red marks that don t go away within 1 hour after removing the splint    Blisters   Date Last Reviewed: 7/1/2016 2000-2019 [employer]. [address] [place] [address]. All rights reserved. This information is not intended as a substitute for professional medical care. Always follow your healthcare professional's instructions.               SINAN Quintana, CNP  [facility] Provider

## 2020-06-30 ENCOUNTER — TELEPHONE (OUTPATIENT)
Dept: ORTHOPEDICS | Facility: CLINIC | Age: 45
End: 2020-06-30

## 2020-06-30 NOTE — TELEPHONE ENCOUNTER
Received call from pt stating that she fell and injured her left arm. XR was taken on 6/29/20. Had Dr. Eubanks review XR and he informed that pt can be followup by [facility]. Scheduled pt with [facility].        -Roberto, ATC- Orthopedics

## 2020-06-30 NOTE — TELEPHONE ENCOUNTER
DIAGNOSIS: left forearm pain // no fracture per XR   APPOINTMENT DATE: 6/30/20   NOTES STATUS DETAILS   OFFICE NOTE from referring provider N/A    OFFICE NOTE from other specialist Internal    DISCHARGE SUMMARY from hospital N/A    DISCHARGE REPORT from the ER N/A    OPERATIVE REPORT N/A    MEDICATION LIST Internal    MRI N/A    CT SCAN N/A    XRAYS (IMAGES & REPORTS) Internal

## 2020-07-03 ENCOUNTER — OFFICE VISIT (OUTPATIENT)
Dept: ORTHOPEDICS | Facility: CLINIC | Age: 45
End: 2020-07-03
Attending: NURSE PRACTITIONER
Payer: COMMERCIAL

## 2020-07-03 ENCOUNTER — PRE VISIT (OUTPATIENT)
Dept: ORTHOPEDICS | Facility: CLINIC | Age: 45
End: 2020-07-03

## 2020-07-03 ENCOUNTER — ANCILLARY PROCEDURE (OUTPATIENT)
Dept: GENERAL RADIOLOGY | Facility: CLINIC | Age: 45
End: 2020-07-03
Attending: FAMILY MEDICINE
Payer: COMMERCIAL

## 2020-07-03 VITALS — BODY MASS INDEX: 24.33 KG/M2 | HEIGHT: 67 IN | WEIGHT: 155 LBS

## 2020-07-03 DIAGNOSIS — S52.335A CLOSED NONDISPLACED OBLIQUE FRACTURE OF SHAFT OF LEFT RADIUS, INITIAL ENCOUNTER: ICD-10-CM

## 2020-07-03 DIAGNOSIS — M79.632 PAIN AND SWELLING OF LEFT FOREARM: Primary | ICD-10-CM

## 2020-07-03 DIAGNOSIS — M79.89 PAIN AND SWELLING OF LEFT FOREARM: Primary | ICD-10-CM

## 2020-07-03 ASSESSMENT — MIFFLIN-ST. JEOR: SCORE: 1375.71

## 2020-07-03 NOTE — PROGRESS NOTES
CHIEF COMPLAINT:  Pain of the Left Forearm       HISTORY OF PRESENT ILLNESS  Ms. Segundo is a pleasant 45 year old year old female who presents to clinic today with acute pain and fracture suspicion regarding left forearm.  Patient states that she was walking her puppy late at night on 6/28/20 and she tripped and fell face first. She noticed the next day she had pain in her forearm particularly with pronation and supination. The majority of her pain is proximal forearm.   She was seen and evaluated at a local urgent care.  Radiographs interpreted by radiologist as no fracture seen.  However there was concern clinically by our practitioner who placed her in a sugar tong splint, concern for forearm fracture.  She was then referred on to us for definitive management and interpretation of clinical picture and radiographs.     Reason for visit:     What part of your body is injured / painful?  left forearm    What caused the injury /pain? Fall    How long ago did your injury occur or pain begin? 6/28/20    What are your most bothersome symptoms? Pain and Swelling    How would you characterize your symptom?  aching and dull    What makes your symptoms better? Ice and aleve    What makes your symptoms worse? Movement    Have you been previously seen for this problem? Yes, UC on 6/29/2020       Additional history: as documented    MEDICAL HISTORY  Patient Active Problem List   Diagnosis     HERVE III (cervical intraepithelial neoplasia III)     Endometrial polyp       Current Outpatient Medications   Medication Sig Dispense Refill     Calcium-Magnesium-Zinc 333-133-5 MG TABS per tablet        cholecalciferol (VITAMIN D3) 5000 units TABS tablet        cyanocobalamin (VITAMIN B-12) 1000 MCG SUBL sublingual tablet        ferrous sulfate (FEROSUL) 325 (65 Fe) MG tablet Take 325 mg by mouth       MULTIPLE VITAMINS-MINERALS ER PO Womens ALIVE         Allergies   Allergen Reactions     Beef-Derived Products Swelling     Food       "Milk Protein Extract Other (See Comments)     abd pain       Family History   Problem Relation Age of Onset     Brain Cancer Mother        Additional medical/Social/Surgical histories reviewed in [facility] and updated as appropriate.     REVIEW OF SYSTEMS (7/3/2020)  A 10-point review of systems was obtained and is negative except for as noted in the HPI.      PHYSICAL EXAM  Ht 1.702 m (5' 7\")   Wt 70.3 kg (155 lb)   LMP 06/15/2020   BMI 24.28 kg/m      General  - normal appearance, in no obvious distress  HEENT  - conjunctivae not injected, moist mucous membranes  CV  - normal radial pulse  Pulm  - normal respiratory pattern, non-labored  Musculoskeletal -left elbow and forearm  - inspection: Moderate sized circular hematoma formation at midshaft left radius and ulna.  - palpation: Exquisitely tender to palpation in region of hematoma  - ROM elbow:  160 deg flexion   0 deg extension   90 deg pronation with minimal pain   90 deg supination with minimal pain  -ROM wrist, full in all planes  - strength: 5/5  strength, 5/5 flexion, extension, pronation, supination  Neuro  - no sensory or motor deficit, grossly normal coordination, normal muscle tone  Skin  -Large region of ecchymosis at midshaft forearm, posterior surface  Psych  - interactive, appropriate, normal mood and affect    IMAGING : XR elbow 3V. Final results and radiologist's interpretation, available in the [facility] health record. Images were reviewed with the patient/family members in the office today. My personal interpretation of the performed imaging is no acute osseous abnormalities, apparent vascular channel of proximal radius.  No evidence for healing fracture.     ASSESSMENT & PLAN  Ms. Segundo is a 45 year old year old female who presents to clinic today with with acute pain and swelling at the left midshaft forearm, between mid ulna and radius.  X-rays at this time repeated and unremarkable.  I do think she sustained a contusion, likely bony as well " as pain relating to a large hematoma formation of forearm musculature.    Diagnosis: Contusion of left forearm    -Ortho-Glass functional splint made for protection  -Continue Ace wrapping for compression  -Protection of site and avoidance of pressure  -Ice, ibuprofen dosing discussed  -Follow up if no improvement in 2 weeks    It was a pleasure seeing April today.    Austin Eubanks DO, CAQSM  Primary Care Sports Medicine

## 2020-07-03 NOTE — LETTER
7/3/2020         RE: Maxine Segundo  [address]  [place] [address]        Dear Colleague,    Thank you for referring your patient, Maxine Segundo, to the [facility]TH[facility]ALK [facility] CLINIC. Please see a copy of my visit note below.          SPORTS & ORTHOPEDIC WALK-IN VISIT 7/3/2020    Primary Care Physician:      She was walking her puppy late at night on 6/28/20 and she tripped and fell face first. She noticed the next day she had pain in her forearm particularly with pronation and supination. The majority of her pain is proximal forearm.     Reason for visit:     What part of your body is injured / painful?  left forearm    What caused the injury /pain? Fall    How long ago did your injury occur or pain begin? 6/28/20    What are your most bothersome symptoms? Pain and Swelling    How would you characterize your symptom?  aching and dull    What makes your symptoms better? Ice and aleve    What makes your symptoms worse? Movement    Have you been previously seen for this problem? Yes, UC on 6/29/2020    Medical History:    Any recent changes to your medical history? No    Any new medication prescribed since last visit? No    Have you had surgery on this body part before? No    Social History:    Occupation: Book keeper     Handedness: Right    Exercise: 3-4 days/week    Review of Systems:    Do you have fever, chills, weight loss? No    Do you have any vision problems? No    Do you have any chest pain or edema? No    Do you have any shortness of breath or wheezing?  No    Do you have stomach problems? No    Do you have any numbness or focal weakness? No    Do you have diabetes? No    Do you have problems with bleeding or clotting? No    Do you have an rashes or other skin lesions? No           CHIEF COMPLAINT:  Pain of the Left Forearm       HISTORY OF PRESENT ILLNESS  Ms. Segundo is a pleasant 45 year old year old female who presents to clinic today with acute pain and  fracture suspicion regarding left forearm.  Patient states that she was walking her puppy late at night on 6/28/20 and she tripped and fell face first. She noticed the next day she had pain in her forearm particularly with pronation and supination. The majority of her pain is proximal forearm.   She was seen and evaluated at a local urgent care.  Radiographs interpreted by radiologist as no fracture seen.  However there was concern clinically by our practitioner who placed her in a sugar tong splint, concern for forearm fracture.  She was then referred on to us for definitive management and interpretation of clinical picture and radiographs.     Reason for visit:     What part of your body is injured / painful?  left forearm    What caused the injury /pain? Fall    How long ago did your injury occur or pain begin? 6/28/20    What are your most bothersome symptoms? Pain and Swelling    How would you characterize your symptom?  aching and dull    What makes your symptoms better? Ice and aleve    What makes your symptoms worse? Movement    Have you been previously seen for this problem? Yes, UC on 6/29/2020       Additional history: as documented    MEDICAL HISTORY  Patient Active Problem List   Diagnosis     HERVE III (cervical intraepithelial neoplasia III)     Endometrial polyp       Current Outpatient Medications   Medication Sig Dispense Refill     Calcium-Magnesium-Zinc 333-133-5 MG TABS per tablet        cholecalciferol (VITAMIN D3) 5000 units TABS tablet        cyanocobalamin (VITAMIN B-12) 1000 MCG SUBL sublingual tablet        ferrous sulfate (FEROSUL) 325 (65 Fe) MG tablet Take 325 mg by mouth       MULTIPLE VITAMINS-MINERALS ER PO Womens ALIVE         Allergies   Allergen Reactions     Beef-Derived Products Swelling     Food      Milk Protein Extract Other (See Comments)     abd pain       Family History   Problem Relation Age of Onset     Brain Cancer Mother        Additional medical/Social/Surgical  "histories reviewed in EPIC and updated as appropriate.     REVIEW OF SYSTEMS (7/3/2020)  A 10-point review of systems was obtained and is negative except for as noted in the HPI.      PHYSICAL EXAM  Ht 1.702 m (5' 7\")   Wt 70.3 kg (155 lb)   LMP 06/15/2020   BMI 24.28 kg/m      General  - normal appearance, in no obvious distress  HEENT  - conjunctivae not injected, moist mucous membranes  CV  - normal radial pulse  Pulm  - normal respiratory pattern, non-labored  Musculoskeletal -left elbow and forearm  - inspection: Moderate sized circular hematoma formation at midshaft left radius and ulna.  - palpation: Exquisitely tender to palpation in region of hematoma  - ROM elbow:  160 deg flexion   0 deg extension   90 deg pronation with minimal pain   90 deg supination with minimal pain  -ROM wrist, full in all planes  - strength: 5/5  strength, 5/5 flexion, extension, pronation, supination  Neuro  - no sensory or motor deficit, grossly normal coordination, normal muscle tone  Skin  -Large region of ecchymosis at midshaft forearm, posterior surface  Psych  - interactive, appropriate, normal mood and affect    IMAGING : XR elbow 3V. Final results and radiologist's interpretation, available in the [facility] health record. Images were reviewed with the patient/family members in the office today. My personal interpretation of the performed imaging is no acute osseous abnormalities, apparent vascular channel of proximal radius.  No evidence for healing fracture.     ASSESSMENT & PLAN  Ms. Segundo is a 45 year old year old female who presents to clinic today with with acute pain and swelling at the left midshaft forearm, between mid ulna and radius.  X-rays at this time repeated and unremarkable.  I do think she sustained a contusion, likely bony as well as pain relating to a large hematoma formation of forearm musculature.    Diagnosis: Contusion of left forearm    -Ortho-Glass functional splint made for " protection  -Continue Ace wrapping for compression  -Protection of site and avoidance of pressure  -Ice, ibuprofen dosing discussed  -Follow up if no improvement in 2 weeks    It was a pleasure seeing April today.    Austin Eubanks DO, CAQSM      Primary Care Sports Medicine

## 2020-07-03 NOTE — PROGRESS NOTES
SPORTS & ORTHOPEDIC WALK-IN VISIT 7/3/2020    Primary Care Physician:      She was walking her puppy late at night on 6/28/20 and she tripped and fell face first. She noticed the next day she had pain in her forearm particularly with pronation and supination. The majority of her pain is proximal forearm.     Reason for visit:     What part of your body is injured / painful?  left forearm    What caused the injury /pain? Fall    How long ago did your injury occur or pain begin? 6/28/20    What are your most bothersome symptoms? Pain and Swelling    How would you characterize your symptom?  aching and dull    What makes your symptoms better? Ice and aleve    What makes your symptoms worse? Movement    Have you been previously seen for this problem? Yes, UC on 6/29/2020    Medical History:    Any recent changes to your medical history? No    Any new medication prescribed since last visit? No    Have you had surgery on this body part before? No    Social History:    Occupation: Book keeper     Handedness: Right    Exercise: 3-4 days/week    Review of Systems:    Do you have fever, chills, weight loss? No    Do you have any vision problems? No    Do you have any chest pain or edema? No    Do you have any shortness of breath or wheezing?  No    Do you have stomach problems? No    Do you have any numbness or focal weakness? No    Do you have diabetes? No    Do you have problems with bleeding or clotting? No    Do you have an rashes or other skin lesions? No

## 2021-01-03 ENCOUNTER — HEALTH MAINTENANCE LETTER (OUTPATIENT)
Age: 46
End: 2021-01-03

## 2021-02-25 ENCOUNTER — PATIENT OUTREACH (OUTPATIENT)
Dept: OBGYN | Facility: CLINIC | Age: 46
End: 2021-02-25

## 2021-03-07 ENCOUNTER — HEALTH MAINTENANCE LETTER (OUTPATIENT)
Age: 46
End: 2021-03-07

## 2021-04-20 NOTE — TELEPHONE ENCOUNTER
Peter Garay,  Patient is lost to pap tracking follow-up. Attempts to contact pt have been made per reminder process and there has been no reply and/or no appt scheduled.     Pap Hx:  03/05/19: [place] Bx's HERVE 1-3. Plan LEEP with Hysteroscopy in the OR.  04/17/19: LEEP HERVE 3, margin's neg. Plan cotest in 1 year.   2/11/20 ASCUS pap with Neg HPV. Plan cotest in 1 year per Ob/Gyn provider.  02/25/21 Reminder Lisa, viewed  03/29/21 Reminder call-lm  04/20/21 Lost to follow-up for pap tracking, fyi routed to provider

## 2021-04-25 ENCOUNTER — HEALTH MAINTENANCE LETTER (OUTPATIENT)
Age: 46
End: 2021-04-25

## 2021-05-13 ENCOUNTER — OFFICE VISIT (OUTPATIENT)
Dept: OBGYN | Facility: CLINIC | Age: 46
End: 2021-05-13
Payer: COMMERCIAL

## 2021-05-13 VITALS
DIASTOLIC BLOOD PRESSURE: 79 MMHG | BODY MASS INDEX: 23.65 KG/M2 | WEIGHT: 151 LBS | SYSTOLIC BLOOD PRESSURE: 126 MMHG | HEART RATE: 69 BPM | TEMPERATURE: 97.9 F

## 2021-05-13 DIAGNOSIS — Z12.31 ENCOUNTER FOR SCREENING MAMMOGRAM FOR BREAST CANCER: ICD-10-CM

## 2021-05-13 DIAGNOSIS — Z01.419 ENCOUNTER FOR GYNECOLOGICAL EXAMINATION WITHOUT ABNORMAL FINDING: Primary | ICD-10-CM

## 2021-05-13 DIAGNOSIS — D22.9 MULTIPLE NEVI: ICD-10-CM

## 2021-05-13 DIAGNOSIS — R87.610 ASCUS OF CERVIX WITH NEGATIVE HIGH RISK HPV: ICD-10-CM

## 2021-05-13 PROCEDURE — 99396 PREV VISIT EST AGE 40-64: CPT | Performed by: OBSTETRICS & GYNECOLOGY

## 2021-05-13 PROCEDURE — 87624 HPV HI-RISK TYP POOLED RSLT: CPT | Performed by: OBSTETRICS & GYNECOLOGY

## 2021-05-13 PROCEDURE — 88175 CYTOPATH C/V AUTO FLUID REDO: CPT | Performed by: OBSTETRICS & GYNECOLOGY

## 2021-05-13 NOTE — PROGRESS NOTES
GYN CLINIC VISIT  2021  CC: annual exam    HPI:  April is a 46 year old  female who presents for annual exam.     Menses are regular q 28-30 days and normal lasting 4 days.  Menses flow: normal.  Patient's last menstrual period was 2021.. Using none for contraception.  She is not currently considering pregnancy.  Besides routine health maintenance,  she would like to discuss a spot on her face just right of her cheek that peels intermittently. Lots of sun exposure as a young person.   Moving to [place] this summer for 's job (rabbi).  GYNECOLOGIC HISTORY:  Menarche:  is sexually active with 1male partner(s) and is currently in monogamous relationship.    History sexually transmitted infections:HPV  STI testing offered?  Declined  BAIRON exposure: No  History of abnormal Pap smear: YES - updated in Problem List and Health Maintenance accordingly  Family history of breast CA: No  Family history of uterine/ovarian CA: No    Family history of colon CA: No    HEALTH MAINTENANCE:  Cholesterol: (  Cholesterol   Date Value Ref Range Status   2020 130 <200 mg/dL Final    History of abnormal lipids: Yes  Mammo: no . History of abnormal Mammo: No.  Regular Self Breast Exams: Yes  Calcium/Vitamin D intake: source:  dairy, dietary supplement(s) Adequate? Yes  TSH: (  TSH   Date Value Ref Range Status   2020 0.94 0.40 - 4.00 mU/L Final    )  Pap; (  Lab Results   Component Value Date    PAP ASC-US 2020    PAP HSIL 01/15/2019    )    HISTORY:  OB History    Para Term  AB Living   8 4 4 0 4 4   SAB TAB Ectopic Multiple Live Births   2 2 0 0 2      # Outcome Date GA Lbr Tom/2nd Weight Sex Delivery Anes PTL Lv   8 Term     F Vag-Spont   OTONIEL   7 Term 1998     Vag-Spont      6 Term      Vag-Spont      5 Term     M Vag-Spont   OTONIEL   4 SAB            3 SAB            2 TAB            1 TAB              Past Medical History:   Diagnosis Date     Abnormal Pap smear  of cervix 01/15/2019, 2/11/20    See problem list     Cervical high risk HPV (human papillomavirus) test positive 01/15/2019    See problem list     Past Surgical History:   Procedure Laterality Date     CONIZATION LEEP N/A 4/17/2019    Procedure: Leep;  Surgeon: Jnaet Garay MD;  Location: [facility] OR     OPERATIVE HYSTEROSCOPY WITH MORCELLATOR N/A 4/17/2019    Procedure: Hysteroscopy With Polypectomy;  Surgeon: Janet Garay MD;  Location: [facility] OR     Family History   Problem Relation Age of Onset     Brain Cancer Mother      Social History     Socioeconomic History     Marital status:      Spouse name: None     Number of children: None     Years of education: None     Highest education level: None   Occupational History     None   Social Needs     Financial resource strain: None     Food insecurity     Worry: None     Inability: None     Transportation needs     Medical: None     Non-medical: None   Tobacco Use     Smoking status: Former Smoker     Types: Cigarettes     Smokeless tobacco: Never Used   Substance and Sexual Activity     Alcohol use: Yes     Frequency: 2-4 times a month     Drinks per session: 1 or 2     Binge frequency: Never     Comment: wine on weekends     Drug use: No     Sexual activity: Yes     Partners: Male     Birth control/protection: None   Lifestyle     Physical activity     Days per week: None     Minutes per session: None     Stress: None   Relationships     Social connections     Talks on phone: None     Gets together: None     Attends Restorationist service: None     Active member of club or organization: None     Attends meetings of clubs or organizations: None     Relationship status: None     Intimate partner violence     Fear of current or ex partner: None     Emotionally abused: None     Physically abused: None     Forced sexual activity: None   Other Topics Concern     None   Social History Narrative     None       Current Outpatient Medications:       Calcium-Magnesium-Zinc 333-133-5 MG TABS per tablet, , Disp: , Rfl:      cholecalciferol (VITAMIN D3) 5000 units TABS tablet, , Disp: , Rfl:      cyanocobalamin (VITAMIN B-12) 1000 MCG SUBL sublingual tablet, , Disp: , Rfl:      ferrous sulfate (FEROSUL) 325 (65 Fe) MG tablet, Take 325 mg by mouth, Disp: , Rfl:      MULTIPLE VITAMINS-MINERALS ER PO, Womens ALIVE, Disp: , Rfl:      Allergies   Allergen Reactions     Beef-Derived Products Swelling     Food      Milk Protein Extract Other (See Comments)     abd pain       Past medical, surgical, social and family history were reviewed and updated in EPIC.    ROS:   C:     NEGATIVE for fever, chills, change in weight  I:       NEGATIVE for worrisome rashes, moles or lesions  E:     NEGATIVE for vision changes or irritation  E/M: NEGATIVE for ear, mouth and throat problems  R:     NEGATIVE for significant cough or SOB  CV:   NEGATIVE for chest pain, palpitations or peripheral edema  GI:     NEGATIVE for nausea, abdominal pain, heartburn, or change in bowel habits  :   NEGATIVE for frequency, dysuria, hematuria, vaginal discharge, or irregular bleeding  M:     NEGATIVE for significant arthralgias or myalgia  N:      NEGATIVE for weakness, dizziness or paresthesias  E:      NEGATIVE for temperature intolerance, skin/hair changes  P:      NEGATIVE for changes in mood or affect.    EXAM:  /79   Pulse 69   Temp 97.9  F (36.6  C) (Oral)   Wt 68.5 kg (151 lb)   LMP 04/22/2021   BMI 23.65 kg/m     BMI: Body mass index is 23.65 kg/m .  Constitutional: healthy, alert and no distress  Head: Normocephalic. No masses, lesions, tenderness or abnormalities  Neck: Neck supple. Trachea midline. No adenopathy. Thyroid symmetric, normal size.   Cardiovascular: RRR.   Respiratory: clear bilaterally.   Breast: Breasts reveal mild symmetric fibrocystic densities, but there are no dominant, discrete, fixed or suspicious masses found.  Gastrointestinal: Abdomen soft, non-tender,  non-distended. No masses, organomegaly.  :  Vulva:  No external lesions, normal female hair distribution, no inguinal adenopathy.    Urethra:  Midline, non-tender, well supported, no discharge  Vagina:  Moist, pink, no abnormal discharge, no lesions  Cervix: multiparous, multiple nabothian cysts. Pap obtained.   Uterus:  Normal size, 6wk, anteverted , non-tender, freely mobile  Ovaries:  No masses appreciated, non-tender, mobile  Rectal Exam: deferred  Musculoskeletal: extremities normal  Skin: no suspicious lesions or rashes  Psychiatric: Affect appropriate, cooperative,mentation appears normal.     COUNSELING:   Reviewed preventive health counseling, as reflected in patient instructions       Regular exercise       Healthy diet/nutrition       breast cancer screening   reports that she has quit smoking. Her smoking use included cigarettes. She has never used smokeless tobacco.    Body mass index is 23.65 kg/m .    FRAX Risk Assessment    ASSESSMENT:  46 year old  female with satisfactory annual exam    1. Encounter for gynecological examination without abnormal finding    2. ASCUS of cervix with negative high risk HPV  Reviewed history  1/15/19 HSIL, +HR HPV, not 16/18. Plan colp.  19: [place] Bx's HERVE 1-3. Plan LEEP with Hysteroscopy in the OR.  19: LEEP HERVE 3, margin's neg. Plan cotest in 1 year.   20 ASCUS pap with Neg HPV. Plan cotest in 1 year per Ob/Gyn provider.  21 Reminder Tarant, viewed  21 Reminder call-lm  21 Lost to follow-up for pap tracking, fyi routed to provider    If any abnormality on pap/HPV test recommend colposcopy    - HPV High Risk Types DNA Cervical  - Pap imaged thin layer diagnostic with HPV (select HPV order below)    3. Multiple nevi  Peeling spot on face, multiple nevi, h/o lots of sun exposure, recommend eval by derm.   - DERMATOLOGY ADULT REFERRAL; Future    4. Encounter for screening mammogram for breast cancer  - *MA Screening Digital  Bilateral; Future      Krista Shipley MD

## 2021-05-17 LAB
COPATH REPORT: NORMAL
PAP: NORMAL

## 2021-05-20 ENCOUNTER — PATIENT OUTREACH (OUTPATIENT)
Dept: OBGYN | Facility: CLINIC | Age: 46
End: 2021-05-20

## 2021-05-20 NOTE — RESULT ENCOUNTER NOTE
Dx  Pap cc'd to provider as FYI only due to hx.  No response needed unless prefer a change in plan.    Normal pap/Neg HPV letter sent through [employer] results. Next pap smear and HPV due in 1 year.     Gracie Camargo, RN, BSN  Pap Tracking Nurse

## 2021-05-27 ENCOUNTER — ANCILLARY PROCEDURE (OUTPATIENT)
Dept: MAMMOGRAPHY | Facility: CLINIC | Age: 46
End: 2021-05-27
Attending: OBSTETRICS & GYNECOLOGY
Payer: COMMERCIAL

## 2021-05-27 DIAGNOSIS — Z12.31 ENCOUNTER FOR SCREENING MAMMOGRAM FOR BREAST CANCER: ICD-10-CM

## 2021-05-27 PROCEDURE — 77067 SCR MAMMO BI INCL CAD: CPT | Mod: GC | Performed by: STUDENT IN AN ORGANIZED HEALTH CARE EDUCATION/TRAINING PROGRAM

## 2021-06-10 ENCOUNTER — ANCILLARY PROCEDURE (OUTPATIENT)
Dept: MAMMOGRAPHY | Facility: CLINIC | Age: 46
End: 2021-06-10
Attending: OBSTETRICS & GYNECOLOGY
Payer: COMMERCIAL

## 2021-06-10 DIAGNOSIS — R92.8 ABNORMAL MAMMOGRAM OF LEFT BREAST: ICD-10-CM

## 2021-06-10 PROCEDURE — 77065 DX MAMMO INCL CAD UNI: CPT | Performed by: RADIOLOGY

## 2021-06-10 PROCEDURE — G0279 TOMOSYNTHESIS, MAMMO: HCPCS | Performed by: RADIOLOGY

## 2021-06-10 PROCEDURE — 76642 ULTRASOUND BREAST LIMITED: CPT | Mod: LT | Performed by: RADIOLOGY

## 2021-10-10 ENCOUNTER — HEALTH MAINTENANCE LETTER (OUTPATIENT)
Age: 46
End: 2021-10-10

## 2022-04-29 ENCOUNTER — PATIENT OUTREACH (OUTPATIENT)
Dept: OBGYN | Facility: CLINIC | Age: 47
End: 2022-04-29
Payer: COMMERCIAL

## 2022-04-29 NOTE — LETTER
April 29, 2022      Maxine Segundo  [address]   [place] [address]        Dear ,    This letter is to remind you that you are due for your follow-up Pap smear and Human Papillomavirus (HPV) test.    Please call [phone] to schedule your appointment at your earliest convenience.    If you have completed the appointment outside of the [facility] system, please have the records forwarded to our office. We will update your chart for your provider to review before your next annual wellness visit.     Thank you for choosing [facility]!      Sincerely,    Your [facility] Care Team

## 2022-05-27 NOTE — TELEPHONE ENCOUNTER
Patient due for Pap and HPV.    Reminder done today via telephone call--pt said that she no longer lives in [place]. Pap tracking ceased.

## 2022-07-16 ENCOUNTER — HEALTH MAINTENANCE LETTER (OUTPATIENT)
Age: 47
End: 2022-07-16

## 2022-09-18 ENCOUNTER — HEALTH MAINTENANCE LETTER (OUTPATIENT)
Age: 47
End: 2022-09-18

## 2023-07-30 ENCOUNTER — HEALTH MAINTENANCE LETTER (OUTPATIENT)
Age: 48
End: 2023-07-30

## 2023-10-08 ENCOUNTER — HEALTH MAINTENANCE LETTER (OUTPATIENT)
Age: 48
End: 2023-10-08

## 2024-09-22 ENCOUNTER — HEALTH MAINTENANCE LETTER (OUTPATIENT)
Age: 49
End: 2024-09-22

## (undated) DEVICE — PAD CHUX UNDERPAD 30X36" P3036C

## (undated) DEVICE — SU SILK 2-0 PS 18" 1588H

## (undated) DEVICE — PREP POVIDONE IODINE SOLUTION 10% 120ML

## (undated) DEVICE — GLOVE PROTEXIS BLUE W/NEU-THERA 6.5  2D73EB65

## (undated) DEVICE — DECANTER TRANSFER DEVICE 2008S

## (undated) DEVICE — ESU ELEC LEEP LOOP 10X10MM YELLOW DLP-S11

## (undated) DEVICE — SPECIMEN CONTAINER 5OZ STERILE 2600SA

## (undated) DEVICE — ESU ELEC LEEP BALL 5MM

## (undated) DEVICE — STERILE VACUUM TUBING SET

## (undated) DEVICE — PREP SKIN SCRUB TRAY 4461A

## (undated) DEVICE — ESU LIGASURE TRIVERSE 3MM FT3000

## (undated) DEVICE — SUCTION CANISTER BEMIS HI FLOW 006772-901

## (undated) DEVICE — TUBING SYS AQUILEX BLUE INFLOW AQL-110 YLW OUTFLOW AQL-111

## (undated) DEVICE — PREP POVIDONE IODINE SCRUB 7.5% 120ML

## (undated) DEVICE — SOL NACL 0.9% IRRIG 3000ML BAG 2B7477

## (undated) DEVICE — GOWN XLG DISP 9545

## (undated) DEVICE — SUCTION MANIFOLD DORNOCH ULTRA CART UL-CL500

## (undated) DEVICE — SWAB PROCTO 16" 2/PK 32-046

## (undated) DEVICE — SEAL SET MYOSURE ROD LENS SCOPE SINGLE USE 40-902

## (undated) DEVICE — STRAP KNEE/BODY 31143004

## (undated) DEVICE — LINEN GOWN X4 5410

## (undated) DEVICE — SOL NACL 0.9% IRRIG 1000ML BOTTLE 2F7124

## (undated) DEVICE — GLOVE PROTEXIS W/NEU-THERA 6.0  2D73TE60

## (undated) DEVICE — JELLY LUBRICATING SURGILUBE 2OZ TUBE 0281-0205-02

## (undated) DEVICE — Device

## (undated) DEVICE — ESU ELEC LEEP LOOP 20X12MM WHITE DLP-W11

## (undated) DEVICE — LINEN TOWEL PACK X5 5464

## (undated) DEVICE — SPECIMEN BAG BEMIS HI FLOW SUCTION WHITE SOCK 533810

## (undated) DEVICE — PANTIES MESH LG/XLG 2PK 706M2

## (undated) DEVICE — ESU GROUND PAD UNIVERSAL W/O CORD

## (undated) DEVICE — DRAPE UNDER BUTTOCK 8483

## (undated) RX ORDER — FENTANYL CITRATE 50 UG/ML
INJECTION, SOLUTION INTRAMUSCULAR; INTRAVENOUS
Status: DISPENSED
Start: 2019-04-17

## (undated) RX ORDER — PROPOFOL 10 MG/ML
INJECTION, EMULSION INTRAVENOUS
Status: DISPENSED
Start: 2019-04-17

## (undated) RX ORDER — KETOROLAC TROMETHAMINE 30 MG/ML
INJECTION, SOLUTION INTRAMUSCULAR; INTRAVENOUS
Status: DISPENSED
Start: 2019-04-17

## (undated) RX ORDER — EPHEDRINE SULFATE 50 MG/ML
INJECTION, SOLUTION INTRAMUSCULAR; INTRAVENOUS; SUBCUTANEOUS
Status: DISPENSED
Start: 2019-04-17

## (undated) RX ORDER — ONDANSETRON 2 MG/ML
INJECTION INTRAMUSCULAR; INTRAVENOUS
Status: DISPENSED
Start: 2019-04-17

## (undated) RX ORDER — LIDOCAINE HYDROCHLORIDE 10 MG/ML
INJECTION, SOLUTION EPIDURAL; INFILTRATION; INTRACAUDAL; PERINEURAL
Status: DISPENSED
Start: 2019-04-17

## (undated) RX ORDER — LIDOCAINE HYDROCHLORIDE 20 MG/ML
INJECTION, SOLUTION EPIDURAL; INFILTRATION; INTRACAUDAL; PERINEURAL
Status: DISPENSED
Start: 2019-04-17